# Patient Record
Sex: FEMALE | Race: WHITE | NOT HISPANIC OR LATINO | Employment: OTHER | URBAN - METROPOLITAN AREA
[De-identification: names, ages, dates, MRNs, and addresses within clinical notes are randomized per-mention and may not be internally consistent; named-entity substitution may affect disease eponyms.]

---

## 2017-12-09 ENCOUNTER — HOSPITAL ENCOUNTER (EMERGENCY)
Facility: HOSPITAL | Age: 67
Discharge: HOME | End: 2017-12-09
Attending: INTERNAL MEDICINE
Payer: MEDICARE

## 2017-12-09 VITALS
DIASTOLIC BLOOD PRESSURE: 76 MMHG | HEART RATE: 57 BPM | RESPIRATION RATE: 28 BRPM | TEMPERATURE: 98 F | WEIGHT: 164 LBS | OXYGEN SATURATION: 100 % | SYSTOLIC BLOOD PRESSURE: 168 MMHG | BODY MASS INDEX: 28 KG/M2

## 2017-12-09 DIAGNOSIS — R07.89 CHEST PAIN, MUSCULOSKELETAL: ICD-10-CM

## 2017-12-09 DIAGNOSIS — I10 UNCONTROLLED HYPERTENSION: Primary | ICD-10-CM

## 2017-12-09 DIAGNOSIS — R07.9 CHEST PAIN: ICD-10-CM

## 2017-12-09 LAB
ALBUMIN SERPL BCP-MCNC: 3.2 G/DL
ALP SERPL-CCNC: 84 U/L
ALT SERPL W/O P-5'-P-CCNC: 7 U/L
ANION GAP SERPL CALC-SCNC: 11 MMOL/L
AST SERPL-CCNC: 20 U/L
BASOPHILS # BLD AUTO: 0.02 K/UL
BASOPHILS NFR BLD: 0.2 %
BILIRUB SERPL-MCNC: 0.6 MG/DL
BNP SERPL-MCNC: 221 PG/ML
BUN SERPL-MCNC: 13 MG/DL
CALCIUM SERPL-MCNC: 9.4 MG/DL
CHLORIDE SERPL-SCNC: 101 MMOL/L
CO2 SERPL-SCNC: 27 MMOL/L
CREAT SERPL-MCNC: 1.1 MG/DL
DIFFERENTIAL METHOD: ABNORMAL
EOSINOPHIL # BLD AUTO: 0.1 K/UL
EOSINOPHIL NFR BLD: 1.5 %
ERYTHROCYTE [DISTWIDTH] IN BLOOD BY AUTOMATED COUNT: 15.7 %
EST. GFR  (AFRICAN AMERICAN): >60 ML/MIN/1.73 M^2
EST. GFR  (NON AFRICAN AMERICAN): 52.1 ML/MIN/1.73 M^2
GLUCOSE SERPL-MCNC: 119 MG/DL
HCT VFR BLD AUTO: 38 %
HGB BLD-MCNC: 12.6 G/DL
LYMPHOCYTES # BLD AUTO: 2.7 K/UL
LYMPHOCYTES NFR BLD: 28.9 %
MCH RBC QN AUTO: 29.4 PG
MCHC RBC AUTO-ENTMCNC: 33.2 G/DL
MCV RBC AUTO: 89 FL
MONOCYTES # BLD AUTO: 0.9 K/UL
MONOCYTES NFR BLD: 9.8 %
NEUTROPHILS # BLD AUTO: 5.5 K/UL
NEUTROPHILS NFR BLD: 59.5 %
PLATELET # BLD AUTO: 265 K/UL
PMV BLD AUTO: 9.4 FL
POTASSIUM SERPL-SCNC: 4.6 MMOL/L
PROT SERPL-MCNC: 7.4 G/DL
RBC # BLD AUTO: 4.29 M/UL
SODIUM SERPL-SCNC: 139 MMOL/L
TROPONIN I SERPL DL<=0.01 NG/ML-MCNC: 0.01 NG/ML
WBC # BLD AUTO: 9.2 K/UL

## 2017-12-09 PROCEDURE — 63600175 PHARM REV CODE 636 W HCPCS: Performed by: INTERNAL MEDICINE

## 2017-12-09 PROCEDURE — 83880 ASSAY OF NATRIURETIC PEPTIDE: CPT

## 2017-12-09 PROCEDURE — 93010 ELECTROCARDIOGRAM REPORT: CPT | Mod: ,,, | Performed by: NUCLEAR MEDICINE

## 2017-12-09 PROCEDURE — 85025 COMPLETE CBC W/AUTO DIFF WBC: CPT

## 2017-12-09 PROCEDURE — 99900035 HC TECH TIME PER 15 MIN (STAT)

## 2017-12-09 PROCEDURE — 93005 ELECTROCARDIOGRAM TRACING: CPT

## 2017-12-09 PROCEDURE — 84484 ASSAY OF TROPONIN QUANT: CPT

## 2017-12-09 PROCEDURE — 99284 EMERGENCY DEPT VISIT MOD MDM: CPT | Mod: 25

## 2017-12-09 PROCEDURE — 96372 THER/PROPH/DIAG INJ SC/IM: CPT

## 2017-12-09 PROCEDURE — 80053 COMPREHEN METABOLIC PANEL: CPT

## 2017-12-09 PROCEDURE — 25000003 PHARM REV CODE 250: Performed by: INTERNAL MEDICINE

## 2017-12-09 RX ORDER — KETOROLAC TROMETHAMINE 30 MG/ML
30 INJECTION, SOLUTION INTRAMUSCULAR; INTRAVENOUS
Status: COMPLETED | OUTPATIENT
Start: 2017-12-09 | End: 2017-12-09

## 2017-12-09 RX ORDER — TRAMADOL HYDROCHLORIDE AND ACETAMINOPHEN 37.5; 325 MG/1; MG/1
1 TABLET, FILM COATED ORAL EVERY 4 HOURS PRN
Qty: 18 TABLET | Refills: 0 | Status: SHIPPED | OUTPATIENT
Start: 2017-12-09 | End: 2017-12-19

## 2017-12-09 RX ORDER — CLONIDINE HYDROCHLORIDE 0.1 MG/1
0.1 TABLET ORAL
Status: COMPLETED | OUTPATIENT
Start: 2017-12-09 | End: 2017-12-09

## 2017-12-09 RX ORDER — AMLODIPINE BESYLATE 5 MG/1
5 TABLET ORAL
Status: COMPLETED | OUTPATIENT
Start: 2017-12-09 | End: 2017-12-09

## 2017-12-09 RX ADMIN — CLONIDINE HYDROCHLORIDE 0.1 MG: 0.1 TABLET ORAL at 03:12

## 2017-12-09 RX ADMIN — KETOROLAC TROMETHAMINE 30 MG: 30 INJECTION, SOLUTION INTRAMUSCULAR at 03:12

## 2017-12-09 RX ADMIN — NITROGLYCERIN 0.5 INCH: 20 OINTMENT TOPICAL at 03:12

## 2017-12-09 RX ADMIN — AMLODIPINE BESYLATE 5 MG: 5 TABLET ORAL at 03:12

## 2017-12-09 NOTE — ED NOTES
Patient reports left breast chest pain, that is 10/10 sharp pain that started this morning while shoveling ice. Patient also states having a productive cough x 1 month with white sputum.     LOC: The patient is awake, alert and aware of environment with an appropriate affect, the patient is oriented x 3 and speaking appropriately.  APPEARANCE: Patient resting comfortably and in no acute distress, patient is clean and well groomed, patient's clothing is properly fastened.  HEENT: Brief WNL  SKIN: Brief WNL. Dry.   MUSCULOSKELETAL: Brief WNL. Bilateral shoulder pain, chronic  RESPIRATORY: Brief WNL. Loose cough that causes pain when coughing.   CARDIAC: Brief WNL. S1S2, NSR, apical and radial regular  GASTRO: Brief WNL  : Brief WNL  Peripheral Vasc: Brief WNL  NEURO: Brief WNL  PSYCH: Brief WNL

## 2017-12-09 NOTE — ED PROVIDER NOTES
Encounter Date: 2017       History     Chief Complaint   Patient presents with    Chest Pain     chest pain x 1 hour, reports shoveling heavy ice this am     Pt presents complaining of chest pain. States was shoveling ice from her porch when the pain started this morning but is getting worse. Pain is sharp, left anterior chest, constant, worse with breathing, coughing and movement; non radiating. States also some productive cough, white sputum. Denies fever, nausea, vomiting, diaphoresis or shortness of breath. Hx of HTN, did not took all her medications today          Review of patient's allergies indicates:  No Known Allergies  Past Medical History:   Diagnosis Date    Coronary artery disease     GERD (gastroesophageal reflux disease)     Hyperlipidemia     Hypertension      Past Surgical History:   Procedure Laterality Date    CARDIAC CATHETERIZATION N/A      SECTION, CLASSIC      HYSTERECTOMY       Family History   Problem Relation Age of Onset    Heart disease Mother     Heart disease Father     Hypertension Father     Heart disease Paternal Grandfather     Heart disease Paternal Grandmother     Heart disease Maternal Grandmother     Heart disease Maternal Grandfather      Social History   Substance Use Topics    Smoking status: Current Every Day Smoker     Packs/day: 0.80     Years: 50.00     Types: Cigarettes    Smokeless tobacco: Never Used    Alcohol use Yes      Comment: beer when at camp     Review of Systems   Constitutional: Negative for appetite change, chills and fever.   HENT: Negative for dental problem, ear pain and sore throat.    Eyes: Negative for visual disturbance.   Respiratory: Positive for cough. Negative for shortness of breath.    Cardiovascular: Positive for chest pain. Negative for palpitations.   Gastrointestinal: Negative for abdominal pain, blood in stool, diarrhea and vomiting.   Genitourinary: Negative for dysuria, pelvic pain and vaginal discharge.    Musculoskeletal: Negative for back pain and myalgias.   Skin: Negative for rash.   Neurological: Negative for weakness and headaches.   Psychiatric/Behavioral: Negative for sleep disturbance.   All other systems reviewed and are negative.      Physical Exam     Initial Vitals [12/09/17 1431]   BP Pulse Resp Temp SpO2   (!) 187/80 67 18 97.7 °F (36.5 °C) 100 %      MAP       115.67         Physical Exam    Nursing note and vitals reviewed.  Constitutional: She appears well-developed and well-nourished. No distress.   HENT:   Head: Normocephalic and atraumatic.   Mouth/Throat: Oropharynx is clear and moist.   Eyes: Conjunctivae are normal. Pupils are equal, round, and reactive to light.   Neck: Normal range of motion. Neck supple.   Cardiovascular: Normal rate, regular rhythm, normal heart sounds and intact distal pulses.   Pulmonary/Chest: She has wheezes.   Prolonged expiratory phase with mild wheezing; pain reproducible by deep breathing   Abdominal: Soft. Bowel sounds are normal. She exhibits no distension. There is no tenderness. There is no rebound and no guarding.   Musculoskeletal: Normal range of motion. She exhibits no edema.   Neurological: She is alert and oriented to person, place, and time. She has normal strength.   Skin: Skin is warm and dry. Capillary refill takes less than 2 seconds. No rash noted.   Psychiatric: Her behavior is normal.         ED Course   Procedures  Labs Reviewed   CBC W/ AUTO DIFFERENTIAL - Abnormal; Notable for the following:        Result Value    RDW 15.7 (*)     All other components within normal limits   COMPREHENSIVE METABOLIC PANEL - Abnormal; Notable for the following:     Glucose 119 (*)     Albumin 3.2 (*)     ALT 7 (*)     eGFR if non  52.1 (*)     All other components within normal limits   B-TYPE NATRIURETIC PEPTIDE - Abnormal; Notable for the following:      (*)     All other components within normal limits   TROPONIN I     EKG Readings:  (Independently Interpreted)   Initial Reading: No STEMI. Rhythm: Normal Sinus Rhythm. Heart Rate: 68. Ectopy: No Ectopy. Conduction: Normal. ST Segments: Normal ST Segments. Axis: Normal. Clinical Impression: Normal Sinus Rhythm     Results for orders placed or performed during the hospital encounter of 12/09/17   CBC auto differential   Result Value Ref Range    WBC 9.20 3.90 - 12.70 K/uL    RBC 4.29 4.00 - 5.40 M/uL    Hemoglobin 12.6 12.0 - 16.0 g/dL    Hematocrit 38.0 37.0 - 48.5 %    MCV 89 82 - 98 fL    MCH 29.4 27.0 - 31.0 pg    MCHC 33.2 32.0 - 36.0 g/dL    RDW 15.7 (H) 11.5 - 14.5 %    Platelets 265 150 - 350 K/uL    MPV 9.4 9.2 - 12.9 fL    Gran # 5.5 1.8 - 7.7 K/uL    Lymph # 2.7 1.0 - 4.8 K/uL    Mono # 0.9 0.3 - 1.0 K/uL    Eos # 0.1 0.0 - 0.5 K/uL    Baso # 0.02 0.00 - 0.20 K/uL    Gran% 59.5 38.0 - 73.0 %    Lymph% 28.9 18.0 - 48.0 %    Mono% 9.8 4.0 - 15.0 %    Eosinophil% 1.5 0.0 - 8.0 %    Basophil% 0.2 0.0 - 1.9 %    Differential Method Automated    Comprehensive metabolic panel   Result Value Ref Range    Sodium 139 136 - 145 mmol/L    Potassium 4.6 3.5 - 5.1 mmol/L    Chloride 101 95 - 110 mmol/L    CO2 27 23 - 29 mmol/L    Glucose 119 (H) 70 - 110 mg/dL    BUN, Bld 13 8 - 23 mg/dL    Creatinine 1.1 0.5 - 1.4 mg/dL    Calcium 9.4 8.7 - 10.5 mg/dL    Total Protein 7.4 6.0 - 8.4 g/dL    Albumin 3.2 (L) 3.5 - 5.2 g/dL    Total Bilirubin 0.6 0.1 - 1.0 mg/dL    Alkaline Phosphatase 84 55 - 135 U/L    AST 20 10 - 40 U/L    ALT 7 (L) 10 - 44 U/L    Anion Gap 11 8 - 16 mmol/L    eGFR if African American >60.0 >60 mL/min/1.73 m^2    eGFR if non  52.1 (A) >60 mL/min/1.73 m^2   Troponin I #1   Result Value Ref Range    Troponin I 0.009 0.000 - 0.026 ng/mL   B-Type natriuretic peptide (BNP)   Result Value Ref Range     (H) 0 - 99 pg/mL     Imaging Results          X-Ray Chest PA And Lateral (Final result)  Result time 12/09/17 16:25:10    Final result by Nolberto Holley MD  (12/09/17 16:25:10)                 Impression:         No acute findings.      Electronically signed by: PER SHANKAR MD  Date:     12/09/17  Time:    16:25              Narrative:    XR CHEST PA AND LATERAL, 12/09/17 16:16:18    Clinical indication: Acute chest pain    Findings:  No previous comparison.  Heart size is normal.  Lung fields are clear with minor nonspecific blunting of the left costophrenic angle.    Mild thoracic spondylosis.                                                   ED Course      Clinical Impression:   The primary encounter diagnosis was Uncontrolled hypertension. Diagnoses of Chest pain and Chest pain, musculoskeletal were also pertinent to this visit.    Disposition:   Disposition: Discharged  Condition: Stable                        Eulalio Segura MD  12/09/17 9943

## 2018-11-08 ENCOUNTER — LAB VISIT (OUTPATIENT)
Dept: LAB | Facility: HOSPITAL | Age: 68
End: 2018-11-08
Attending: INTERNAL MEDICINE
Payer: MEDICARE

## 2018-11-08 DIAGNOSIS — E04.1 NONTOXIC UNINODULAR GOITER: ICD-10-CM

## 2018-11-08 DIAGNOSIS — E04.1 NONTOXIC UNINODULAR GOITER: Primary | ICD-10-CM

## 2018-11-08 LAB
T4 FREE SERPL-MCNC: 1.08 NG/DL
THYROPEROXIDASE IGG SERPL-ACNC: <6 IU/ML
TSH SERPL DL<=0.005 MIU/L-ACNC: 2.02 UIU/ML

## 2018-11-08 PROCEDURE — 86376 MICROSOMAL ANTIBODY EACH: CPT

## 2018-11-08 PROCEDURE — 84439 ASSAY OF FREE THYROXINE: CPT | Mod: PO

## 2018-11-08 PROCEDURE — 36415 COLL VENOUS BLD VENIPUNCTURE: CPT | Mod: PO

## 2018-11-08 PROCEDURE — 84443 ASSAY THYROID STIM HORMONE: CPT | Mod: PO

## 2020-11-16 ENCOUNTER — OFFICE VISIT (OUTPATIENT)
Dept: PULMONOLOGY | Facility: CLINIC | Age: 70
End: 2020-11-16
Payer: MEDICARE

## 2020-11-16 ENCOUNTER — LAB VISIT (OUTPATIENT)
Dept: LAB | Facility: HOSPITAL | Age: 70
End: 2020-11-16
Attending: INTERNAL MEDICINE
Payer: MEDICARE

## 2020-11-16 VITALS
RESPIRATION RATE: 16 BRPM | SYSTOLIC BLOOD PRESSURE: 134 MMHG | HEIGHT: 64 IN | WEIGHT: 157.88 LBS | BODY MASS INDEX: 26.95 KG/M2 | DIASTOLIC BLOOD PRESSURE: 90 MMHG | OXYGEN SATURATION: 98 % | HEART RATE: 75 BPM

## 2020-11-16 DIAGNOSIS — R06.89 DYSPNEA AND RESPIRATORY ABNORMALITIES: Chronic | ICD-10-CM

## 2020-11-16 DIAGNOSIS — R06.00 DYSPNEA AND RESPIRATORY ABNORMALITIES: Primary | Chronic | ICD-10-CM

## 2020-11-16 DIAGNOSIS — R06.00 DYSPNEA AND RESPIRATORY ABNORMALITIES: Chronic | ICD-10-CM

## 2020-11-16 DIAGNOSIS — R06.89 DYSPNEA AND RESPIRATORY ABNORMALITIES: Primary | Chronic | ICD-10-CM

## 2020-11-16 DIAGNOSIS — F17.210 NICOTINE DEPENDENCE, CIGARETTES, UNCOMPLICATED: Chronic | ICD-10-CM

## 2020-11-16 DIAGNOSIS — R06.02 SHORTNESS OF BREATH: ICD-10-CM

## 2020-11-16 LAB — ERYTHROCYTE [SEDIMENTATION RATE] IN BLOOD BY WESTERGREN METHOD: 17 MM/HR (ref 0–36)

## 2020-11-16 PROCEDURE — 86039 ANTINUCLEAR ANTIBODIES (ANA): CPT

## 2020-11-16 PROCEDURE — 3075F PR MOST RECENT SYSTOLIC BLOOD PRESS GE 130-139MM HG: ICD-10-PCS | Mod: CPTII,S$GLB,, | Performed by: INTERNAL MEDICINE

## 2020-11-16 PROCEDURE — 86255 FLUORESCENT ANTIBODY SCREEN: CPT

## 2020-11-16 PROCEDURE — 3075F SYST BP GE 130 - 139MM HG: CPT | Mod: CPTII,S$GLB,, | Performed by: INTERNAL MEDICINE

## 2020-11-16 PROCEDURE — 3080F DIAST BP >= 90 MM HG: CPT | Mod: CPTII,S$GLB,, | Performed by: INTERNAL MEDICINE

## 2020-11-16 PROCEDURE — 1159F MED LIST DOCD IN RCRD: CPT | Mod: S$GLB,,, | Performed by: INTERNAL MEDICINE

## 2020-11-16 PROCEDURE — 99999 PR PBB SHADOW E&M-EST. PATIENT-LVL V: ICD-10-PCS | Mod: PBBFAC,,, | Performed by: INTERNAL MEDICINE

## 2020-11-16 PROCEDURE — 3080F PR MOST RECENT DIASTOLIC BLOOD PRESSURE >= 90 MM HG: ICD-10-PCS | Mod: CPTII,S$GLB,, | Performed by: INTERNAL MEDICINE

## 2020-11-16 PROCEDURE — 86140 C-REACTIVE PROTEIN: CPT

## 2020-11-16 PROCEDURE — 36415 COLL VENOUS BLD VENIPUNCTURE: CPT

## 2020-11-16 PROCEDURE — 99204 PR OFFICE/OUTPT VISIT, NEW, LEVL IV, 45-59 MIN: ICD-10-PCS | Mod: S$GLB,,, | Performed by: INTERNAL MEDICINE

## 2020-11-16 PROCEDURE — 86038 ANTINUCLEAR ANTIBODIES: CPT

## 2020-11-16 PROCEDURE — 3008F BODY MASS INDEX DOCD: CPT | Mod: CPTII,S$GLB,, | Performed by: INTERNAL MEDICINE

## 2020-11-16 PROCEDURE — 99999 PR PBB SHADOW E&M-EST. PATIENT-LVL V: CPT | Mod: PBBFAC,,, | Performed by: INTERNAL MEDICINE

## 2020-11-16 PROCEDURE — 86235 NUCLEAR ANTIGEN ANTIBODY: CPT | Mod: 59

## 2020-11-16 PROCEDURE — 85651 RBC SED RATE NONAUTOMATED: CPT

## 2020-11-16 PROCEDURE — 1159F PR MEDICATION LIST DOCUMENTED IN MEDICAL RECORD: ICD-10-PCS | Mod: S$GLB,,, | Performed by: INTERNAL MEDICINE

## 2020-11-16 PROCEDURE — 3008F PR BODY MASS INDEX (BMI) DOCUMENTED: ICD-10-PCS | Mod: CPTII,S$GLB,, | Performed by: INTERNAL MEDICINE

## 2020-11-16 PROCEDURE — 99204 OFFICE O/P NEW MOD 45 MIN: CPT | Mod: S$GLB,,, | Performed by: INTERNAL MEDICINE

## 2020-11-16 RX ORDER — ROPINIROLE 0.5 MG/1
TABLET, FILM COATED ORAL
Status: ON HOLD | COMMUNITY
Start: 2020-08-11 | End: 2023-01-01 | Stop reason: HOSPADM

## 2020-11-16 RX ORDER — DIPHENOXYLATE HYDROCHLORIDE AND ATROPINE SULFATE 2.5; .025 MG/1; MG/1
TABLET ORAL
Status: ON HOLD | COMMUNITY
End: 2023-01-01 | Stop reason: HOSPADM

## 2020-11-16 RX ORDER — ASPIRIN 81 MG/1
TABLET ORAL
Status: ON HOLD | COMMUNITY
End: 2023-01-01 | Stop reason: HOSPADM

## 2020-11-16 RX ORDER — TRAZODONE HYDROCHLORIDE 50 MG/1
50 TABLET ORAL
Status: ON HOLD | COMMUNITY
Start: 2020-10-27 | End: 2023-01-01 | Stop reason: HOSPADM

## 2020-11-16 RX ORDER — ALBUTEROL SULFATE 0.83 MG/ML
3 SOLUTION RESPIRATORY (INHALATION)
Status: ON HOLD | COMMUNITY
End: 2023-01-01 | Stop reason: HOSPADM

## 2020-11-16 RX ORDER — AMLODIPINE BESYLATE 5 MG/1
5 TABLET ORAL
Status: ON HOLD | COMMUNITY
Start: 2020-10-13 | End: 2023-01-01 | Stop reason: HOSPADM

## 2020-11-16 RX ORDER — SERTRALINE HYDROCHLORIDE 25 MG/1
TABLET, FILM COATED ORAL
Status: ON HOLD | COMMUNITY
End: 2023-01-01 | Stop reason: HOSPADM

## 2020-11-16 RX ORDER — NITROGLYCERIN 0.4 MG/1
TABLET SUBLINGUAL
Status: ON HOLD | COMMUNITY
End: 2023-01-01 | Stop reason: HOSPADM

## 2020-11-16 NOTE — PATIENT INSTRUCTIONS
Lung Anatomy  Your lungs take air in to give your body oxygen, which the body needs to work. Your lungs, like all the tissues in your body, are made up of billions of tiny specialized cells. Old lung cells die and are replaced by new, identical lung cells. This natural process helps ensure healthy lungs.    Date Last Reviewed: 11/1/2016  © [id number] [employer]. [address] [place] [address], [place] [address]. All rights reserved. This information is not intended as a substitute for professional medical care. Always follow your healthcare professional's instructions.

## 2020-11-16 NOTE — PROGRESS NOTES
New patient    Subjective:      Patient ID: Yelena Briceño is a 70 y.o. female.    Patient Active Problem List   Diagnosis    Essential hypertension, benign    Other and unspecified hyperlipidemia    Coronary atherosclerosis of unspecified type of vessel, native or graft    Esophageal reflux    PAD (peripheral artery disease)    Nicotine dependence, cigarettes, uncomplicated    Dyspnea and respiratory abnormalities       Problem list has been reviewed.    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 S/P definitve right hemicolectomy colon cancer. Adjuvant chemotherapy was deferred. Colonoscopy in May 2019: 3 polyps removed.     PET 10/20/20:Patchy nodular and more dense consolidation in the anterior aspects of the upper lobes, lingula, right middle lobe, and to a lesser extent inferomedial right lower lobe with mild to moderate FDG activity. This is favored to be infectious/inflammatory rather than neoplastic/metastatic in etiology.      Treated with PO antibiotics - AZITHROMYCIN ( Z NABOR) .    Patient reports cough and difficulty breathing and denies hemoptysis. Patient denies recent sick contacts.   Patient does not have new pets. Patient does not have a history of asthma. Patient does not have a history of environmental allergens.  Patient has not traveled recently. Patient does have a history of smoking. She has smoked for 56 years. She smoked up to 3 - 4 packs per day. She currently smoke approximately 0.5 PPD. Patient has  had a previous chest x-ray. Patient has not had a PPD done.      Previous Report Reviewed: lab reports, office notes and radiology reports     Past Medical History: The following portions of the patient's history were reviewed and updated as appropriate:   She  has a  past surgical history that includes Hysterectomy;  section, classic; and Cardiac catheterization (N/A).  Her family history includes Heart disease in her father, maternal grandfather, maternal grandmother, mother, paternal grandfather, and paternal grandmother; Hypertension in her father.  She  reports that she has been smoking cigarettes. She has a 40.00 pack-year smoking history. She has never used smokeless tobacco. She reports current alcohol use. She reports that she does not use drugs.  She has a current medication list which includes the following prescription(s): albuterol, amlodipine, aspirin, atorvastatin, clopidogrel, diphenoxylate-atropine 2.5-0.025 mg, ergocalciferol, furosemide, isosorbide mononitrate, lisinopril, multivit-min/ferrous fumarate, nitroglycerin, pantoprazole, ropinirole, sertraline, trazodone, and aspirin.  She has No Known Allergies..    Review of Systems   Constitutional: Positive for weight loss and weight gain. Negative for chills, activity change, appetite change and weakness.   HENT: Negative for rhinorrhea, sore throat, trouble swallowing and congestion.    Eyes: Negative for itching.   Respiratory: Positive for cough, sputum production, wheezing, asthma nighttime symptoms and dyspnea on extertion.    Cardiovascular: Positive for chest pain and leg swelling. Negative for palpitations.   Endocrine: Negative for cold intolerance and heat intolerance.    Musculoskeletal: Positive for arthralgias, back pain and gait problem.   Skin: Negative for rash.   Gastrointestinal: Positive for acid reflux. Negative for nausea, vomiting, abdominal pain and abdominal distention.        Dairrhea   Neurological: Negative for dizziness, light-headedness and headaches.   Psychiatric/Behavioral: Positive for sleep disturbance.          Occupational History:  , Cook, .  Denies occupational exposure to asbestos, silica and petrochemicals.    Avocational Exposures:  none    Pet  "Exposures:  Dog: Denies allergy to dog dander.       Objective:     Vitals:    11/16/20 1428   BP: (!) 134/90   Pulse: 75   Resp: 16   SpO2: 98%   Weight: 71.6 kg (157 lb 13.6 oz)   Height: 5' 4" (1.626 m)     Body mass index is 27.09 kg/m².    Physical Exam  Constitutional:       Appearance: She is well-developed.   HENT:      Head: Normocephalic and atraumatic.      Right Ear: Hearing, tympanic membrane, ear canal and external ear normal.      Left Ear: Hearing, tympanic membrane, ear canal and external ear normal.      Nose: Nose normal. No mucosal edema or rhinorrhea.      Mouth/Throat:      Pharynx: Uvula midline.   Eyes:      Conjunctiva/sclera: Conjunctivae normal.      Pupils: Pupils are equal, round, and reactive to light.   Neck:      Musculoskeletal: Neck supple.      Thyroid: No thyroid mass or thyromegaly.      Vascular: No carotid bruit.      Trachea: Trachea normal.   Cardiovascular:      Rate and Rhythm: Normal rate and regular rhythm.      Pulses:           Dorsalis pedis pulses are 0 on the right side and 0 on the left side.      Heart sounds: S1 normal and S2 normal. No murmur. No gallop.       Comments: Doppler pulses are unable faintly  Pulmonary:      Effort: Pulmonary effort is normal.      Breath sounds: Normal breath sounds. No wheezing, rhonchi or rales.   Abdominal:      General: Bowel sounds are normal. There is no distension.      Palpations: Abdomen is soft.      Tenderness: There is no abdominal tenderness. There is no guarding.   Musculoskeletal: Normal range of motion.   Lymphadenopathy:      Cervical: No cervical adenopathy.   Skin:     General: Skin is warm and dry.      Findings: No rash.   Neurological:      Mental Status: She is alert and oriented to person, place, and time.      Cranial Nerves: No cranial nerve deficit.      Sensory: No sensory deficit.         Personal Diagnostic Review  No results found for this or any previous visit.      Assessment /Plan:     Discussed " diagnosis, its evaluation, treatment and usual course. All questions answered.    Problem List Items Addressed This Visit        Other    Nicotine dependence, cigarettes, uncomplicated (Chronic)    Current Assessment & Plan     I advised patient to quit, and offered support.  Discussed current use pattern.  Asked patient to inform me when they set a quit date.         Relevant Orders    Ambulatory referral/consult to Smoking Cessation Program    Dyspnea and respiratory abnormalities - Primary (Chronic)    Current Assessment & Plan     Etiology unclear.  Multifactorial etiology suspected.  Likely contributors to etiology (checked)    [x] Pulmonary airway disease    [x]  Pulmonary parenchymal disease  [x] Pulmonary vascular disease   [x] Pleural disease  [] Pulmonary vasculitis  [] Hypoventilation ( chest wall deformity, neuromuscular disease, obesity etc)  [] Anemia  [] Thyroid disease.  [x] Cardiac illess  []         Sleep disorder    EVALUATION:  [x]        Complete PFT with bronchodilator.  []        Bronchial challenge with methacholine.   [x]        Stress test, pulmonary.  [x]        PULM - Arterial Blood Gases  [x]        Chest X Ray  []        CT scan of chest.   [x]        DAMIAN  [x]        Sedimentation rate  [x]        C-reactive protein  [x]        Anti-neutrophilic cytoplasmic antibody          PLAN:  Discussed diagnosis, its evaluation, treatment and usual course.  All questions answered.        Call if shortness of breath worsens, blood in sputum, change in character of cough, development of fever or chills, inability to maintain nutrition and hydration.     Re evaluate in four weeks with results.           Relevant Orders    DAMIAN Screen w/Reflex    C-Reactive Protein    Sedimentation rate    Anti-Neutrophilic Cytoplasmic Antibody    Complete PFT with bronchodilator    PULM - Arterial Blood Gases--in addition to PFT only    Pulmonary stress test    X-Ray Chest PA And Lateral              TIME SPENT WITH  PATIENT: Time spent: 40 minutes in face to face  discussion concerning diagnosis, prognosis, review of lab and test results, benefits of treatment as well as management of disease, counseling of patient and coordination of care between various health  care providers . Greater than half the time spent was used for coordination of care and counseling of patient.     Follow up in about 1 month (around 12/16/2020) for Tobacco use disorder, Shortness of breath.

## 2020-11-16 NOTE — ASSESSMENT & PLAN NOTE
Etiology unclear.  Multifactorial etiology suspected.  Likely contributors to etiology (checked)    [x] Pulmonary airway disease    [x]  Pulmonary parenchymal disease  [x] Pulmonary vascular disease   [x] Pleural disease  [] Pulmonary vasculitis  [] Hypoventilation ( chest wall deformity, neuromuscular disease, obesity etc)  [] Anemia  [] Thyroid disease.  [x] Cardiac illess  []         Sleep disorder    EVALUATION:  [x]        Complete PFT with bronchodilator.  []        Bronchial challenge with methacholine.   [x]        Stress test, pulmonary.  [x]        PULM - Arterial Blood Gases  [x]        Chest X Ray  []        CT scan of chest.   [x]        DAMIAN  [x]        Sedimentation rate  [x]        C-reactive protein  [x]        Anti-neutrophilic cytoplasmic antibody          PLAN:  Discussed diagnosis, its evaluation, treatment and usual course.  All questions answered.        Call if shortness of breath worsens, blood in sputum, change in character of cough, development of fever or chills, inability to maintain nutrition and hydration.     Re evaluate in four weeks with results.

## 2020-11-16 NOTE — ASSESSMENT & PLAN NOTE
I advised patient to quit, and offered support.  Discussed current use pattern.  Asked patient to inform me when they set a quit date.

## 2020-11-17 LAB
ANA PATTERN 1: NORMAL
ANA SER QL IF: POSITIVE
ANA TITR SER IF: NORMAL {TITER}
CRP SERPL-MCNC: 9.9 MG/L (ref 0–8.2)

## 2020-11-19 LAB
ANCA AB TITR SER IF: NORMAL TITER
ANTI SM ANTIBODY: 0.05 RATIO (ref 0–0.99)
ANTI SM/RNP ANTIBODY: 0.05 RATIO (ref 0–0.99)
ANTI-SM INTERPRETATION: NEGATIVE
ANTI-SM/RNP INTERPRETATION: NEGATIVE
ANTI-SSA ANTIBODY: 0.04 RATIO (ref 0–0.99)
ANTI-SSA INTERPRETATION: NEGATIVE
ANTI-SSB ANTIBODY: 0.05 RATIO (ref 0–0.99)
ANTI-SSB INTERPRETATION: NEGATIVE
DSDNA AB SER-ACNC: NORMAL [IU]/ML
P-ANCA TITR SER IF: NORMAL TITER

## 2021-01-11 ENCOUNTER — TELEPHONE (OUTPATIENT)
Dept: PULMONOLOGY | Facility: CLINIC | Age: 71
End: 2021-01-11

## 2021-10-27 ENCOUNTER — HOSPITAL ENCOUNTER (OUTPATIENT)
Dept: RADIOLOGY | Facility: HOSPITAL | Age: 71
Discharge: HOME | End: 2021-10-27
Attending: INTERNAL MEDICINE
Payer: MEDICARE

## 2021-10-27 DIAGNOSIS — J44.1 ACUTE EXACERBATION OF CHRONIC OBSTRUCTIVE PULMONARY DISEASE: Primary | ICD-10-CM

## 2021-10-27 DIAGNOSIS — J44.1 ACUTE EXACERBATION OF CHRONIC OBSTRUCTIVE PULMONARY DISEASE: ICD-10-CM

## 2021-10-27 PROCEDURE — 71046 XR CHEST PA AND LATERAL: ICD-10-PCS | Mod: 26,,, | Performed by: RADIOLOGY

## 2021-10-27 PROCEDURE — 71046 X-RAY EXAM CHEST 2 VIEWS: CPT | Mod: TC,PO

## 2021-10-27 PROCEDURE — 71046 X-RAY EXAM CHEST 2 VIEWS: CPT | Mod: 26,,, | Performed by: RADIOLOGY

## 2022-01-01 ENCOUNTER — HOSPITAL ENCOUNTER (EMERGENCY)
Facility: HOSPITAL | Age: 72
Discharge: HOME | End: 2022-11-07
Attending: EMERGENCY MEDICINE
Payer: MEDICARE

## 2022-01-01 ENCOUNTER — HOSPITAL ENCOUNTER (OUTPATIENT)
Dept: RADIOLOGY | Facility: HOSPITAL | Age: 72
Discharge: HOME | End: 2022-12-19
Attending: INTERNAL MEDICINE
Payer: MEDICARE

## 2022-01-01 ENCOUNTER — HOSPITAL ENCOUNTER (EMERGENCY)
Facility: HOSPITAL | Age: 72
Discharge: HOME | End: 2022-10-14
Attending: EMERGENCY MEDICINE
Payer: MEDICARE

## 2022-01-01 VITALS
BODY MASS INDEX: 24.07 KG/M2 | RESPIRATION RATE: 22 BRPM | TEMPERATURE: 98 F | OXYGEN SATURATION: 95 % | SYSTOLIC BLOOD PRESSURE: 130 MMHG | DIASTOLIC BLOOD PRESSURE: 64 MMHG | HEART RATE: 88 BPM | WEIGHT: 140.19 LBS

## 2022-01-01 VITALS
TEMPERATURE: 98 F | BODY MASS INDEX: 24.89 KG/M2 | WEIGHT: 145 LBS | OXYGEN SATURATION: 97 % | HEART RATE: 77 BPM | SYSTOLIC BLOOD PRESSURE: 193 MMHG | DIASTOLIC BLOOD PRESSURE: 83 MMHG | RESPIRATION RATE: 18 BRPM

## 2022-01-01 VITALS — WEIGHT: 140.19 LBS | HEIGHT: 64 IN | BODY MASS INDEX: 23.93 KG/M2

## 2022-01-01 DIAGNOSIS — G57.93 NEURALGIA OF BOTH LOWER EXTREMITIES: ICD-10-CM

## 2022-01-01 DIAGNOSIS — R09.02 HYPOXIA: ICD-10-CM

## 2022-01-01 DIAGNOSIS — Z12.31 VISIT FOR SCREENING MAMMOGRAM: ICD-10-CM

## 2022-01-01 DIAGNOSIS — J10.1 INFLUENZA A: ICD-10-CM

## 2022-01-01 DIAGNOSIS — R04.0 ANTERIOR EPISTAXIS: Primary | ICD-10-CM

## 2022-01-01 DIAGNOSIS — J18.9 PNEUMONIA OF RIGHT LOWER LOBE DUE TO INFECTIOUS ORGANISM: Primary | ICD-10-CM

## 2022-01-01 LAB
ALBUMIN SERPL BCP-MCNC: 3.1 G/DL (ref 3.5–5.2)
ALLENS TEST: ABNORMAL
ALP SERPL-CCNC: 60 U/L (ref 55–135)
ALT SERPL W/O P-5'-P-CCNC: 7 U/L (ref 10–44)
ANION GAP SERPL CALC-SCNC: 13 MMOL/L (ref 8–16)
AST SERPL-CCNC: 27 U/L (ref 10–40)
BASOPHILS # BLD AUTO: 0.01 K/UL (ref 0–0.2)
BASOPHILS NFR BLD: 0.1 % (ref 0–1.9)
BILIRUB SERPL-MCNC: 0.8 MG/DL (ref 0.1–1)
BNP SERPL-MCNC: 195 PG/ML (ref 0–99)
BUN SERPL-MCNC: 14 MG/DL (ref 8–23)
CALCIUM SERPL-MCNC: 8.8 MG/DL (ref 8.7–10.5)
CHLORIDE SERPL-SCNC: 92 MMOL/L (ref 95–110)
CO2 SERPL-SCNC: 21 MMOL/L (ref 23–29)
CREAT SERPL-MCNC: 1.2 MG/DL (ref 0.5–1.4)
CTP QC/QA: YES
DELSYS: ABNORMAL
DIFFERENTIAL METHOD: ABNORMAL
EOSINOPHIL # BLD AUTO: 0 K/UL (ref 0–0.5)
EOSINOPHIL NFR BLD: 0.5 % (ref 0–8)
ERYTHROCYTE [DISTWIDTH] IN BLOOD BY AUTOMATED COUNT: 13.7 % (ref 11.5–14.5)
EST. GFR  (NO RACE VARIABLE): 48.1 ML/MIN/1.73 M^2
GLUCOSE SERPL-MCNC: 125 MG/DL (ref 70–110)
HCO3 UR-SCNC: 26.4 MMOL/L (ref 24–28)
HCT VFR BLD AUTO: 32.5 % (ref 37–48.5)
HGB BLD-MCNC: 11.5 G/DL (ref 12–16)
IMM GRANULOCYTES # BLD AUTO: 0.02 K/UL (ref 0–0.04)
IMM GRANULOCYTES NFR BLD AUTO: 0.2 % (ref 0–0.5)
LYMPHOCYTES # BLD AUTO: 1 K/UL (ref 1–4.8)
LYMPHOCYTES NFR BLD: 12.2 % (ref 18–48)
MCH RBC QN AUTO: 32 PG (ref 27–31)
MCHC RBC AUTO-ENTMCNC: 35.4 G/DL (ref 32–36)
MCV RBC AUTO: 91 FL (ref 82–98)
MODE: ABNORMAL
MONOCYTES # BLD AUTO: 0.4 K/UL (ref 0.3–1)
MONOCYTES NFR BLD: 4.6 % (ref 4–15)
NEUTROPHILS # BLD AUTO: 6.7 K/UL (ref 1.8–7.7)
NEUTROPHILS NFR BLD: 82.4 % (ref 38–73)
NRBC BLD-RTO: 0 /100 WBC
PCO2 BLDA: 36 MMHG (ref 35–45)
PH SMN: 7.47 [PH] (ref 7.35–7.45)
PLATELET # BLD AUTO: 158 K/UL (ref 150–450)
PMV BLD AUTO: 9.4 FL (ref 9.2–12.9)
PO2 BLDA: 54 MMHG (ref 80–100)
POC BE: 3 MMOL/L
POC MOLECULAR INFLUENZA A AGN: POSITIVE
POC MOLECULAR INFLUENZA B AGN: NEGATIVE
POC SATURATED O2: 90 % (ref 95–100)
POTASSIUM SERPL-SCNC: 4.3 MMOL/L (ref 3.5–5.1)
PROT SERPL-MCNC: 6.8 G/DL (ref 6–8.4)
RBC # BLD AUTO: 3.59 M/UL (ref 4–5.4)
SAMPLE: ABNORMAL
SITE: ABNORMAL
SODIUM SERPL-SCNC: 126 MMOL/L (ref 136–145)
TROPONIN I SERPL DL<=0.01 NG/ML-MCNC: 0.01 NG/ML (ref 0–0.03)
WBC # BLD AUTO: 8.11 K/UL (ref 3.9–12.7)

## 2022-01-01 PROCEDURE — 25000242 PHARM REV CODE 250 ALT 637 W/ HCPCS: Mod: ER | Performed by: EMERGENCY MEDICINE

## 2022-01-01 PROCEDURE — 99900035 HC TECH TIME PER 15 MIN (STAT): Mod: ER

## 2022-01-01 PROCEDURE — 63600175 PHARM REV CODE 636 W HCPCS: Mod: ER | Performed by: EMERGENCY MEDICINE

## 2022-01-01 PROCEDURE — 77067 MAMMO DIGITAL SCREENING BILAT WITH TOMO: ICD-10-PCS | Mod: 26,,, | Performed by: RADIOLOGY

## 2022-01-01 PROCEDURE — 94761 N-INVAS EAR/PLS OXIMETRY MLT: CPT | Mod: ER

## 2022-01-01 PROCEDURE — 99281 EMR DPT VST MAYX REQ PHY/QHP: CPT | Mod: ER

## 2022-01-01 PROCEDURE — 99285 EMERGENCY DEPT VISIT HI MDM: CPT | Mod: 25,ER

## 2022-01-01 PROCEDURE — 77063 BREAST TOMOSYNTHESIS BI: CPT | Mod: TC,PO

## 2022-01-01 PROCEDURE — 85025 COMPLETE CBC W/AUTO DIFF WBC: CPT | Mod: ER | Performed by: EMERGENCY MEDICINE

## 2022-01-01 PROCEDURE — 77063 BREAST TOMOSYNTHESIS BI: CPT | Mod: 26,,, | Performed by: RADIOLOGY

## 2022-01-01 PROCEDURE — 77067 SCR MAMMO BI INCL CAD: CPT | Mod: TC,PO

## 2022-01-01 PROCEDURE — 36600 WITHDRAWAL OF ARTERIAL BLOOD: CPT | Mod: ER

## 2022-01-01 PROCEDURE — 80053 COMPREHEN METABOLIC PANEL: CPT | Mod: ER | Performed by: EMERGENCY MEDICINE

## 2022-01-01 PROCEDURE — 77063 MAMMO DIGITAL SCREENING BILAT WITH TOMO: ICD-10-PCS | Mod: 26,,, | Performed by: RADIOLOGY

## 2022-01-01 PROCEDURE — 93005 ELECTROCARDIOGRAM TRACING: CPT | Mod: ER

## 2022-01-01 PROCEDURE — 96374 THER/PROPH/DIAG INJ IV PUSH: CPT | Mod: ER

## 2022-01-01 PROCEDURE — 77067 SCR MAMMO BI INCL CAD: CPT | Mod: 26,,, | Performed by: RADIOLOGY

## 2022-01-01 PROCEDURE — 93010 EKG 12-LEAD: ICD-10-PCS | Mod: ,,, | Performed by: INTERNAL MEDICINE

## 2022-01-01 PROCEDURE — 94640 AIRWAY INHALATION TREATMENT: CPT | Mod: ER,XB

## 2022-01-01 PROCEDURE — 93010 ELECTROCARDIOGRAM REPORT: CPT | Mod: ,,, | Performed by: INTERNAL MEDICINE

## 2022-01-01 PROCEDURE — 84484 ASSAY OF TROPONIN QUANT: CPT | Mod: ER | Performed by: EMERGENCY MEDICINE

## 2022-01-01 PROCEDURE — 27000221 HC OXYGEN, UP TO 24 HOURS: Mod: ER

## 2022-01-01 PROCEDURE — 83880 ASSAY OF NATRIURETIC PEPTIDE: CPT | Mod: ER | Performed by: EMERGENCY MEDICINE

## 2022-01-01 PROCEDURE — 87502 INFLUENZA DNA AMP PROBE: CPT | Mod: ER

## 2022-01-01 PROCEDURE — 82803 BLOOD GASES ANY COMBINATION: CPT | Mod: ER

## 2022-01-01 PROCEDURE — 25000003 PHARM REV CODE 250: Mod: ER | Performed by: EMERGENCY MEDICINE

## 2022-01-01 RX ORDER — IPRATROPIUM BROMIDE AND ALBUTEROL SULFATE 2.5; .5 MG/3ML; MG/3ML
3 SOLUTION RESPIRATORY (INHALATION)
Status: COMPLETED | OUTPATIENT
Start: 2022-01-01 | End: 2022-01-01

## 2022-01-01 RX ORDER — GABAPENTIN 300 MG/1
300 CAPSULE ORAL 3 TIMES DAILY
Qty: 42 CAPSULE | Refills: 0 | Status: ON HOLD | OUTPATIENT
Start: 2022-01-01 | End: 2023-01-01 | Stop reason: HOSPADM

## 2022-01-01 RX ORDER — TRAMADOL HYDROCHLORIDE 50 MG/1
50 TABLET ORAL EVERY 8 HOURS PRN
Qty: 12 TABLET | Refills: 0 | Status: SHIPPED | OUTPATIENT
Start: 2022-01-01 | End: 2022-01-01

## 2022-01-01 RX ORDER — OSELTAMIVIR PHOSPHATE 75 MG/1
CAPSULE ORAL
Status: ON HOLD | COMMUNITY
End: 2023-01-01 | Stop reason: HOSPADM

## 2022-01-01 RX ORDER — LEVOFLOXACIN 750 MG/1
750 TABLET ORAL
Status: COMPLETED | OUTPATIENT
Start: 2022-01-01 | End: 2022-01-01

## 2022-01-01 RX ORDER — METHYLPREDNISOLONE SOD SUCC 125 MG
125 VIAL (EA) INJECTION
Status: COMPLETED | OUTPATIENT
Start: 2022-01-01 | End: 2022-01-01

## 2022-01-01 RX ORDER — LEVOFLOXACIN 750 MG/1
750 TABLET ORAL DAILY
Qty: 4 TABLET | Refills: 0 | Status: SHIPPED | OUTPATIENT
Start: 2022-01-01 | End: 2022-01-01

## 2022-01-01 RX ADMIN — IPRATROPIUM BROMIDE AND ALBUTEROL SULFATE 3 ML: 2.5; .5 SOLUTION RESPIRATORY (INHALATION) at 05:11

## 2022-01-01 RX ADMIN — METHYLPREDNISOLONE SODIUM SUCCINATE 125 MG: 125 INJECTION, POWDER, FOR SOLUTION INTRAMUSCULAR; INTRAVENOUS at 05:11

## 2022-01-01 RX ADMIN — IPRATROPIUM BROMIDE AND ALBUTEROL SULFATE 3 ML: 2.5; .5 SOLUTION RESPIRATORY (INHALATION) at 06:11

## 2022-01-01 RX ADMIN — LEVOFLOXACIN 750 MG: 750 TABLET, FILM COATED ORAL at 07:11

## 2022-04-22 ENCOUNTER — HOSPITAL ENCOUNTER (OUTPATIENT)
Dept: RADIOLOGY | Facility: HOSPITAL | Age: 72
Discharge: HOME | End: 2022-04-22
Attending: INTERNAL MEDICINE
Payer: MEDICARE

## 2022-04-22 DIAGNOSIS — R25.2 CRAMP IN LOWER LEG: ICD-10-CM

## 2022-04-22 DIAGNOSIS — R25.2 CRAMP AND SPASM: ICD-10-CM

## 2022-04-22 DIAGNOSIS — M25.562 PAIN IN LEFT KNEE: Primary | ICD-10-CM

## 2022-04-22 PROCEDURE — 72100 X-RAY EXAM L-S SPINE 2/3 VWS: CPT | Mod: TC,PO

## 2022-04-22 PROCEDURE — 72100 XR LUMBAR SPINE AP AND LATERAL: ICD-10-PCS | Mod: 26,,, | Performed by: RADIOLOGY

## 2022-04-22 PROCEDURE — 72100 X-RAY EXAM L-S SPINE 2/3 VWS: CPT | Mod: 26,,, | Performed by: RADIOLOGY

## 2022-10-14 NOTE — ED PROVIDER NOTES
Encounter Date: 10/14/2022       History     Chief Complaint   Patient presents with    Epistaxis     States her nose has been bleeding since 5am. States she takes aspirin      72-year-old female with past medical history of hypertension, hyperlipidemia, coronary artery disease presents to the emergency department with complaints of right-sided nose bleed.  This started about 3 hours ago.  Patient says she has had nasal congestion over the past few days and was rubbing her nose when she noticed bleeding coming from the right side.  Patient was unable to get this bleeding to stop and came to the emergency department for further evaluation and treatment.  Patient denies any trauma, fevers, chills, sore throat, vomiting, chest pain, abdominal pain.    Review of patient's allergies indicates:  No Known Allergies  Past Medical History:   Diagnosis Date    Coronary artery disease     GERD (gastroesophageal reflux disease)     Hyperlipidemia     Hypertension      Past Surgical History:   Procedure Laterality Date    CARDIAC CATHETERIZATION N/A      SECTION, CLASSIC      HYSTERECTOMY       Family History   Problem Relation Age of Onset    Heart disease Mother     Heart disease Father     Hypertension Father     Heart disease Paternal Grandfather     Heart disease Paternal Grandmother     Heart disease Maternal Grandmother     Heart disease Maternal Grandfather      Social History     Tobacco Use    Smoking status: Every Day     Packs/day: 0.80     Years: 50.00     Pack years: 40.00     Types: Cigarettes    Smokeless tobacco: Never   Substance Use Topics    Alcohol use: Yes     Comment: beer when at camp    Drug use: No     Review of Systems   Constitutional:  Negative for diaphoresis and fever.   HENT:  Positive for nosebleeds. Negative for congestion, dental problem and sore throat.    Eyes:  Negative for pain and visual disturbance.   Respiratory:  Negative for cough and shortness of breath.    Cardiovascular:   Negative for chest pain and palpitations.   Gastrointestinal:  Negative for abdominal pain, diarrhea, nausea and vomiting.   Genitourinary:  Negative for dysuria and flank pain.   Musculoskeletal:  Negative for back pain and neck pain.   Skin:  Negative for rash and wound.   Neurological:  Negative for weakness, numbness and headaches.   Psychiatric/Behavioral:  Negative for agitation and confusion.      Physical Exam     Initial Vitals [10/14/22 0808]   BP Pulse Resp Temp SpO2   (!) 217/91 80 18 98.4 °F (36.9 °C) 98 %      MAP       --         Physical Exam    Constitutional: She appears well-developed and well-nourished.   HENT:   Head: Normocephalic and atraumatic.   In the right Nare, just inferior to the nasal opening there is a small abrasion with bleeding.  Bleeding easily stopped with direct pressure.  No septal hematoma.  No blood in the oropharynx.   Eyes: EOM are normal. Pupils are equal, round, and reactive to light.   Neck: Neck supple.   Normal range of motion.  Cardiovascular:  Normal rate and regular rhythm.           Pulmonary/Chest: Breath sounds normal. No respiratory distress.   Abdominal: She exhibits no distension. There is no abdominal tenderness.   Musculoskeletal:      Cervical back: Normal range of motion and neck supple.     Neurological: She is alert and oriented to person, place, and time.   Skin: Skin is warm and dry.   Psychiatric: She has a normal mood and affect.       ED Course   Procedures  Labs Reviewed - No data to display       Imaging Results    None          Medications - No data to display              ED Course as of 10/14/22 1643   Fri Oct 14, 2022   0822 Bleeding stopped, went over home care. BP elevated, no clinical signs of HTN emergency. She has not taken home BP medication today, will have her take this at home and f/u with PCP.  [BA]   0822 8:22 AM Reassessment: I reassessed the pt.  The pt is resting comfortably and is NAD.  Pt states their sx have improved.  Discussed test results, shared treatment plan, specific conditions for return, and the need for f/u.  Answered their questions at this time.  Pt understands and agrees to the plan.  The pt has remained hemodynamically stable through ED course and is stable for discharge.    [BA]      ED Course User Index  [BA] Julian Fuentes MD                 Clinical Impression:   Final diagnoses:  [R04.0] Anterior epistaxis (Primary)        ED Disposition Condition    Discharge Stable          ED Prescriptions    None       Follow-up Information       Follow up With Specialties Details Why Contact Info    Prakash Jo MD Pathology Schedule an appointment as soon as possible for a visit in 2 days For re-evaluation and further treatment, For wound re-check [facility] [facility] [address]  [facility] [address]  [phone]      [facility] - Emergency Dept Emergency Medicine Go today If symptoms worsen, For re-evaluation and further treatment, As needed [id number] [facility] [address]  [facility] [id number] [phone]             Julian Fuentes MD  10/14/22 5158

## 2022-11-07 NOTE — ED PROVIDER NOTES
Encounter Date: 2022       History     Chief Complaint   Patient presents with    Leg Pain     BLE pain, recent colonoscopy, and given Tamiflu for exposure to the flu at home     71 y/o F with PMH of HTN, HLD, GERD, CAD, COPD, history of colon cancer here with c/o bilateral leg pain. This started 3-4 days ago, is an electric shocking like pain in the legs that is constant, worsens with palpation. Patient denies any leg swelling, numbness, or weakness. She was exposed to influenza at home, was started on tamiflu ppx. Hey oxygen saturations were low in triage on arrival, and she does endorse a cough. Additionally, had a colonoscopy several days ago. Denies any new low back pain above her chronic level, fever, chills, chest pain, N/V/D.       Review of patient's allergies indicates:  No Known Allergies  Past Medical History:   Diagnosis Date    Coronary artery disease     GERD (gastroesophageal reflux disease)     Hyperlipidemia     Hypertension      Past Surgical History:   Procedure Laterality Date    CARDIAC CATHETERIZATION N/A      SECTION, CLASSIC      HYSTERECTOMY       Family History   Problem Relation Age of Onset    Heart disease Mother     Heart disease Father     Hypertension Father     Heart disease Paternal Grandfather     Heart disease Paternal Grandmother     Heart disease Maternal Grandmother     Heart disease Maternal Grandfather      Social History     Tobacco Use    Smoking status: Every Day     Packs/day: 0.80     Years: 50.00     Pack years: 40.00     Types: Cigarettes    Smokeless tobacco: Never   Substance Use Topics    Alcohol use: Yes     Comment: beer when at camp    Drug use: No     Review of Systems   Constitutional:  Negative for diaphoresis and fever.   HENT:  Negative for congestion, dental problem and sore throat.    Eyes:  Negative for pain and visual disturbance.   Respiratory:  Positive for cough. Negative for shortness of breath.    Cardiovascular:  Negative for chest  pain and palpitations.   Gastrointestinal:  Negative for abdominal pain, diarrhea, nausea and vomiting.   Genitourinary:  Negative for dysuria and flank pain.   Musculoskeletal:  Negative for neck pain.        Leg pain. Chronic back pain.    Skin:  Negative for rash and wound.   Neurological:  Negative for weakness, numbness and headaches.   Psychiatric/Behavioral:  Negative for agitation and confusion.      Physical Exam     Initial Vitals [11/07/22 1644]   BP Pulse Resp Temp SpO2   (!) 108/53 82 18 98 °F (36.7 °C) (!) 91 %      MAP       --         Physical Exam    Constitutional: She appears well-developed and well-nourished.   HENT:   Head: Normocephalic and atraumatic.   Mouth/Throat: Oropharynx is clear and moist.   Eyes: EOM are normal. Pupils are equal, round, and reactive to light.   Neck: Neck supple.   Normal range of motion.  Cardiovascular:  Normal rate and regular rhythm.           Pulmonary/Chest: She has no wheezes. She has no rales.   Decreased breath sounds bilaterally.    Abdominal: She exhibits no distension. There is no abdominal tenderness.   Musculoskeletal:         General: No tenderness. Normal range of motion.      Cervical back: Normal range of motion and neck supple.     Neurological: She is alert and oriented to person, place, and time. She has normal strength. No sensory deficit.   Neuropathic pain in a boot like distribution in the b/l lower extremities.    Skin: Skin is warm and dry.   Psychiatric: She has a normal mood and affect.       ED Course   Procedures  Labs Reviewed   CBC W/ AUTO DIFFERENTIAL - Abnormal; Notable for the following components:       Result Value    RBC 3.59 (*)     Hemoglobin 11.5 (*)     Hematocrit 32.5 (*)     MCH 32.0 (*)     Gran % 82.4 (*)     Lymph % 12.2 (*)     All other components within normal limits   B-TYPE NATRIURETIC PEPTIDE - Abnormal; Notable for the following components:     (*)     All other components within normal limits    COMPREHENSIVE METABOLIC PANEL - Abnormal; Notable for the following components:    Sodium 126 (*)     Chloride 92 (*)     CO2 21 (*)     Glucose 125 (*)     Albumin 3.1 (*)     ALT 7 (*)     eGFR 48.1 (*)     All other components within normal limits   POCT INFLUENZA A/B MOLECULAR - Abnormal; Notable for the following components:    POC Molecular Influenza A Ag Positive (*)     All other components within normal limits   ISTAT PROCEDURE - Abnormal; Notable for the following components:    POC PH 7.473 (*)     POC PO2 54 (*)     POC SATURATED O2 90 (*)     All other components within normal limits   TROPONIN I          Imaging Results              X-Ray Chest AP Portable (Final result)  Result time 11/07/22 17:51:47      Final result by Nolberto Holley MD (11/07/22 17:51:47)                   Impression:      Right lower lobe infiltrate.      Electronically signed by: Nolberto Holley MD  Date:    11/07/2022  Time:    17:51               Narrative:    EXAMINATION:  XR CHEST AP PORTABLE    CLINICAL HISTORY:  Shortness of breath, SOB;    COMPARISON:  10/27/2021 chest x-ray.    FINDINGS:  Normal heart size.    The left lung is clear.    Low-grade infiltrates suspected at the right lung base.                                       Medications   methylPREDNISolone sodium succinate injection 125 mg (125 mg Intravenous Given 11/7/22 1726)   albuterol-ipratropium 2.5 mg-0.5 mg/3 mL nebulizer solution 3 mL (3 mLs Nebulization Given 11/7/22 1800)   levoFLOXacin tablet 750 mg (750 mg Oral Given 11/7/22 1951)                 ED Course as of 11/07/22 2157 Mon Nov 07, 2022 1812 Julian BOSS M.D., am handing off care of the patient to oncoming physician: Dr. Monte.   We have discussed patient history, exam, and workup and treatment thus far, as well as potential disposition options. They understand, and will take over management from here forward.        [BA]   1945 Offered admission for RLL pneumonia; pt prefers  to go home . Strict return precautions discussed. Encouraged increased dietary salt intake.  [ND]      ED Course User Index  [BA] Julian Fuentes MD  [ND] Richie Monte MD          Recent Results (from the past 24 hour(s))   POCT Influenza A/B Molecular    Collection Time: 11/07/22  5:07 PM   Result Value Ref Range    POC Molecular Influenza A Ag Positive (A) Negative, Not Reported    POC Molecular Influenza B Ag Negative Negative, Not Reported     Acceptable Yes    CBC auto differential    Collection Time: 11/07/22  5:26 PM   Result Value Ref Range    WBC 8.11 3.90 - 12.70 K/uL    RBC 3.59 (L) 4.00 - 5.40 M/uL    Hemoglobin 11.5 (L) 12.0 - 16.0 g/dL    Hematocrit 32.5 (L) 37.0 - 48.5 %    MCV 91 82 - 98 fL    MCH 32.0 (H) 27.0 - 31.0 pg    MCHC 35.4 32.0 - 36.0 g/dL    RDW 13.7 11.5 - 14.5 %    Platelets 158 150 - 450 K/uL    MPV 9.4 9.2 - 12.9 fL    Immature Granulocytes 0.2 0.0 - 0.5 %    Gran # (ANC) 6.7 1.8 - 7.7 K/uL    Immature Grans (Abs) 0.02 0.00 - 0.04 K/uL    Lymph # 1.0 1.0 - 4.8 K/uL    Mono # 0.4 0.3 - 1.0 K/uL    Eos # 0.0 0.0 - 0.5 K/uL    Baso # 0.01 0.00 - 0.20 K/uL    nRBC 0 0 /100 WBC    Gran % 82.4 (H) 38.0 - 73.0 %    Lymph % 12.2 (L) 18.0 - 48.0 %    Mono % 4.6 4.0 - 15.0 %    Eosinophil % 0.5 0.0 - 8.0 %    Basophil % 0.1 0.0 - 1.9 %    Differential Method Automated    Brain natriuretic peptide    Collection Time: 11/07/22  5:26 PM   Result Value Ref Range     (H) 0 - 99 pg/mL   ISTAT PROCEDURE    Collection Time: 11/07/22  6:07 PM   Result Value Ref Range    POC PH 7.473 (H) 7.35 - 7.45    POC PCO2 36.0 35 - 45 mmHg    POC PO2 54 (LL) 80 - 100 mmHg    POC HCO3 26.4 24 - 28 mmol/L    POC BE 3 -2 to 2 mmol/L    POC SATURATED O2 90 (L) 95 - 100 %    Sample ARTERIAL     Site RR     Allens Test Pass     DelSys Room Air     Mode SPONT    Troponin I    Collection Time: 11/07/22  6:20 PM   Result Value Ref Range    Troponin I 0.007 0.000 - 0.026 ng/mL   Comprehensive  metabolic panel    Collection Time: 11/07/22  6:20 PM   Result Value Ref Range    Sodium 126 (L) 136 - 145 mmol/L    Potassium 4.3 3.5 - 5.1 mmol/L    Chloride 92 (L) 95 - 110 mmol/L    CO2 21 (L) 23 - 29 mmol/L    Glucose 125 (H) 70 - 110 mg/dL    BUN 14 8 - 23 mg/dL    Creatinine 1.2 0.5 - 1.4 mg/dL    Calcium 8.8 8.7 - 10.5 mg/dL    Total Protein 6.8 6.0 - 8.4 g/dL    Albumin 3.1 (L) 3.5 - 5.2 g/dL    Total Bilirubin 0.8 0.1 - 1.0 mg/dL    Alkaline Phosphatase 60 55 - 135 U/L    AST 27 10 - 40 U/L    ALT 7 (L) 10 - 44 U/L    Anion Gap 13 8 - 16 mmol/L    eGFR 48.1 (A) >60 mL/min/1.73 m^2       Patient's evaluation in the ED does not suggest any emergent or life threatening medical conditions requiring immediate intervention beyond what was provided in the ED, and I believe patient is safe for discharge.  Regardless, an unremarkable evaluation in the ED does not preclude the development or presence of a serious or life threatening condition. As such, patient was given return instructions for any change or worsening in symptoms.           Clinical Impression:   Final diagnoses:  [R09.02] Hypoxia  [J18.9] Pneumonia of right lower lobe due to infectious organism (Primary)  [J10.1] Influenza A  [G57.93] Neuralgia of both lower extremities        ED Disposition Condition    Discharge Stable          ED Prescriptions       Medication Sig Dispense Start Date End Date Auth. Provider    gabapentin (NEURONTIN) 300 MG capsule Take 1 capsule (300 mg total) by mouth 3 (three) times daily. for 14 days 42 capsule 11/7/2022 11/21/2022 Richie Monte MD    levoFLOXacin (LEVAQUIN) 750 MG tablet Take 1 tablet (750 mg total) by mouth once daily. for 4 days 4 tablet 11/8/2022 11/12/2022 Richie Monte MD    traMADoL (ULTRAM) 50 mg tablet Take 1 tablet (50 mg total) by mouth every 8 (eight) hours as needed (breakthrough pain not relieved by Tylenol). 12 tablet 11/7/2022 11/14/2022 Richie Monte MD          Follow-up  Information       Follow up With Specialties Details Why Contact Info    Prakash Jo MD Pathology Schedule an appointment as soon as possible for a visit in 3 days  [facility]  [facility] [address]  [phone]      [facility] - Emergency Dept Emergency Medicine  As needed, If symptoms worsen [id number]  [facility] [address] [phone]             Richie Monte MD  11/07/22 2845

## 2023-01-01 ENCOUNTER — OFFICE VISIT (OUTPATIENT)
Dept: ORTHOPEDICS | Facility: CLINIC | Age: 73
End: 2023-01-01
Payer: MEDICARE

## 2023-01-01 ENCOUNTER — HOSPITAL ENCOUNTER (INPATIENT)
Facility: HOSPITAL | Age: 73
LOS: 1 days | DRG: 208 | End: 2023-08-27
Attending: EMERGENCY MEDICINE | Admitting: INTERNAL MEDICINE
Payer: MEDICARE

## 2023-01-01 ENCOUNTER — HOSPITAL ENCOUNTER (OUTPATIENT)
Dept: RADIOLOGY | Facility: HOSPITAL | Age: 73
Discharge: HOME | End: 2023-05-09
Attending: ORTHOPAEDIC SURGERY
Payer: MEDICARE

## 2023-01-01 ENCOUNTER — HOSPITAL ENCOUNTER (EMERGENCY)
Facility: HOSPITAL | Age: 73
Discharge: HOME | End: 2023-04-29
Attending: EMERGENCY MEDICINE
Payer: MEDICARE

## 2023-01-01 ENCOUNTER — HOSPITAL ENCOUNTER (OUTPATIENT)
Dept: RADIOLOGY | Facility: HOSPITAL | Age: 73
Discharge: HOME | End: 2023-06-06
Attending: ORTHOPAEDIC SURGERY
Payer: MEDICARE

## 2023-01-01 ENCOUNTER — PATIENT MESSAGE (OUTPATIENT)
Dept: RESEARCH | Facility: HOSPITAL | Age: 73
End: 2023-01-01
Payer: MEDICARE

## 2023-01-01 ENCOUNTER — PATIENT MESSAGE (OUTPATIENT)
Dept: ORTHOPEDICS | Facility: CLINIC | Age: 73
End: 2023-01-01
Payer: MEDICARE

## 2023-01-01 VITALS
DIASTOLIC BLOOD PRESSURE: 54 MMHG | OXYGEN SATURATION: 96 % | RESPIRATION RATE: 24 BRPM | BODY MASS INDEX: 23.07 KG/M2 | HEIGHT: 64 IN | WEIGHT: 135.13 LBS | TEMPERATURE: 95 F | SYSTOLIC BLOOD PRESSURE: 124 MMHG

## 2023-01-01 VITALS — HEIGHT: 64 IN | BODY MASS INDEX: 24.07 KG/M2 | WEIGHT: 141 LBS

## 2023-01-01 VITALS
HEART RATE: 78 BPM | RESPIRATION RATE: 16 BRPM | TEMPERATURE: 99 F | DIASTOLIC BLOOD PRESSURE: 71 MMHG | WEIGHT: 141.88 LBS | HEIGHT: 64 IN | OXYGEN SATURATION: 95 % | BODY MASS INDEX: 24.22 KG/M2 | SYSTOLIC BLOOD PRESSURE: 171 MMHG

## 2023-01-01 VITALS — HEIGHT: 64 IN | WEIGHT: 141 LBS | BODY MASS INDEX: 24.07 KG/M2

## 2023-01-01 VITALS — BODY MASS INDEX: 24.07 KG/M2 | WEIGHT: 141 LBS | HEIGHT: 64 IN

## 2023-01-01 DIAGNOSIS — S52.602A CLOSED FRACTURE DISTAL RADIUS AND ULNA, LEFT, INITIAL ENCOUNTER: Primary | ICD-10-CM

## 2023-01-01 DIAGNOSIS — S52.502D CLOSED FRACTURE OF DISTAL ENDS OF LEFT RADIUS AND ULNA WITH ROUTINE HEALING, SUBSEQUENT ENCOUNTER: Primary | ICD-10-CM

## 2023-01-01 DIAGNOSIS — S52.502A CLOSED FRACTURE DISTAL RADIUS AND ULNA, LEFT, INITIAL ENCOUNTER: Primary | ICD-10-CM

## 2023-01-01 DIAGNOSIS — M25.532 LEFT WRIST PAIN: ICD-10-CM

## 2023-01-01 DIAGNOSIS — S52.502A CLOSED FRACTURE DISTAL RADIUS AND ULNA, LEFT, INITIAL ENCOUNTER: ICD-10-CM

## 2023-01-01 DIAGNOSIS — R07.9 CHEST PAIN: ICD-10-CM

## 2023-01-01 DIAGNOSIS — I48.91 ATRIAL FIBRILLATION: ICD-10-CM
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
## 2023-01-01 DIAGNOSIS — R06.02 SOB (SHORTNESS OF BREATH): ICD-10-CM

## 2023-01-01 DIAGNOSIS — S52.602A CLOSED FRACTURE DISTAL RADIUS AND ULNA, LEFT, INITIAL ENCOUNTER: ICD-10-CM

## 2023-01-01 DIAGNOSIS — S52.602D CLOSED FRACTURE OF DISTAL ENDS OF LEFT RADIUS AND ULNA WITH ROUTINE HEALING, SUBSEQUENT ENCOUNTER: Primary | ICD-10-CM

## 2023-01-01 DIAGNOSIS — I48.91 A-FIB: ICD-10-CM

## 2023-01-01 DIAGNOSIS — I48.91 ATRIAL FIBRILLATION WITH RAPID VENTRICULAR RESPONSE: Primary | ICD-10-CM

## 2023-01-01 DIAGNOSIS — R00.0 TACHYCARDIA: ICD-10-CM

## 2023-01-01 DIAGNOSIS — J44.1 COPD WITH ACUTE EXACERBATION: ICD-10-CM

## 2023-01-01 DIAGNOSIS — I50.30 PULMONARY EDEMA WITH DIASTOLIC CHF, NYHA CLASS 3: ICD-10-CM

## 2023-01-01 DIAGNOSIS — M25.532 LEFT WRIST PAIN: Primary | ICD-10-CM

## 2023-01-01 LAB
ALBUMIN SERPL BCP-MCNC: 1.8 G/DL (ref 3.5–5.2)
ALBUMIN SERPL BCP-MCNC: 2 G/DL (ref 3.5–5.2)
ALBUMIN SERPL BCP-MCNC: 2.6 G/DL (ref 3.5–5.2)
ALBUMIN SERPL BCP-MCNC: 3 G/DL (ref 3.5–5.2)
ALBUMIN SERPL BCP-MCNC: 3.1 G/DL (ref 3.5–5.2)
ALLENS TEST: ABNORMAL
ALP SERPL-CCNC: 21 U/L (ref 55–135)
ALP SERPL-CCNC: 28 U/L (ref 55–135)
ALP SERPL-CCNC: 42 U/L (ref 55–135)
ALP SERPL-CCNC: 53 U/L (ref 55–135)
ALT SERPL W/O P-5'-P-CCNC: 10 U/L (ref 10–44)
ALT SERPL W/O P-5'-P-CCNC: 10 U/L (ref 10–44)
ALT SERPL W/O P-5'-P-CCNC: 12 U/L (ref 10–44)
ALT SERPL W/O P-5'-P-CCNC: 12 U/L (ref 10–44)
AMMONIA PLAS-SCNC: 75 UMOL/L (ref 10–50)
ANION GAP SERPL CALC-SCNC: 12 MMOL/L (ref 8–16)
ANION GAP SERPL CALC-SCNC: 13 MMOL/L (ref 8–16)
ANION GAP SERPL CALC-SCNC: 13 MMOL/L (ref 8–16)
ANION GAP SERPL CALC-SCNC: 14 MMOL/L (ref 8–16)
ANION GAP SERPL CALC-SCNC: 15 MMOL/L (ref 8–16)
ANISOCYTOSIS BLD QL SMEAR: SLIGHT
AORTIC ROOT ANNULUS: 2.86 CM
APTT PPP: 35.6 SEC (ref 21–32)
APTT PPP: 41.3 SEC (ref 21–32)
APTT PPP: 43.4 SEC (ref 21–32)
APTT PPP: 47.9 SEC (ref 21–32)
ASCENDING AORTA: 2.65 CM
AST SERPL-CCNC: 38 U/L (ref 10–40)
AST SERPL-CCNC: 44 U/L (ref 10–40)
AST SERPL-CCNC: 45 U/L (ref 10–40)
AST SERPL-CCNC: 54 U/L (ref 10–40)
AV INDEX (PROSTH): 0.75
AV MEAN GRADIENT: 4 MMHG
AV PEAK GRADIENT: 6 MMHG
AV VALVE AREA BY VELOCITY RATIO: 1.85 CM²
AV VALVE AREA: 2.05 CM²
AV VELOCITY RATIO: 0.67
BACTERIA #/AREA URNS AUTO: ABNORMAL /HPF
BACTERIA #/AREA URNS HPF: ABNORMAL /HPF
BASOPHILS # BLD AUTO: 0.02 K/UL (ref 0–0.2)
BASOPHILS # BLD AUTO: 0.04 K/UL (ref 0–0.2)
BASOPHILS # BLD AUTO: 0.07 K/UL (ref 0–0.2)
BASOPHILS NFR BLD: 0.2 % (ref 0–1.9)
BASOPHILS NFR BLD: 0.5 % (ref 0–1.9)
BASOPHILS NFR BLD: 2.6 % (ref 0–1.9)
BILIRUB SERPL-MCNC: 1 MG/DL (ref 0.1–1)
BILIRUB SERPL-MCNC: 1.3 MG/DL (ref 0.1–1)
BILIRUB UR QL STRIP: ABNORMAL
BILIRUB UR QL STRIP: NEGATIVE
BNP SERPL-MCNC: 384 PG/ML (ref 0–99)
BNP SERPL-MCNC: 898 PG/ML (ref 0–99)
BSA FOR ECHO PROCEDURE: 1.67 M2
BUN SERPL-MCNC: 18 MG/DL (ref 8–23)
BUN SERPL-MCNC: 26 MG/DL (ref 8–23)
BUN SERPL-MCNC: 38 MG/DL (ref 8–23)
BUN SERPL-MCNC: 43 MG/DL (ref 8–23)
BUN SERPL-MCNC: 44 MG/DL (ref 8–23)
CALCIUM SERPL-MCNC: 7.5 MG/DL (ref 8.7–10.5)
CALCIUM SERPL-MCNC: 7.9 MG/DL (ref 8.7–10.5)
CALCIUM SERPL-MCNC: 8.7 MG/DL (ref 8.7–10.5)
CALCIUM SERPL-MCNC: 8.7 MG/DL (ref 8.7–10.5)
CALCIUM SERPL-MCNC: 9 MG/DL (ref 8.7–10.5)
CHLORIDE SERPL-SCNC: 93 MMOL/L (ref 95–110)
CHLORIDE SERPL-SCNC: 94 MMOL/L (ref 95–110)
CHLORIDE SERPL-SCNC: 94 MMOL/L (ref 95–110)
CLARITY UR REFRACT.AUTO: ABNORMAL
CLARITY UR: ABNORMAL
CO2 SERPL-SCNC: 19 MMOL/L (ref 23–29)
CO2 SERPL-SCNC: 20 MMOL/L (ref 23–29)
CO2 SERPL-SCNC: 20 MMOL/L (ref 23–29)
CO2 SERPL-SCNC: 21 MMOL/L (ref 23–29)
CO2 SERPL-SCNC: 22 MMOL/L (ref 23–29)
COLOR UR AUTO: YELLOW
COLOR UR: YELLOW
CREAT SERPL-MCNC: 1.1 MG/DL (ref 0.5–1.4)
CREAT SERPL-MCNC: 1.1 MG/DL (ref 0.5–1.4)
CREAT SERPL-MCNC: 1.6 MG/DL (ref 0.5–1.4)
CREAT SERPL-MCNC: 2.2 MG/DL (ref 0.5–1.4)
CREAT SERPL-MCNC: 2.2 MG/DL (ref 0.5–1.4)
CREAT UR-MCNC: 188 MG/DL (ref 15–325)
CV ECHO LV RWT: 0.47 CM
D DIMER PPP IA.FEU-MCNC: 28.68 MG/L FEU
DELSYS: ABNORMAL
DIFFERENTIAL METHOD: ABNORMAL
DOP CALC AO PEAK VEL: 1.26 M/S
DOP CALC AO VTI: 18.9 CM
DOP CALC LVOT AREA: 2.7 CM2
DOP CALC LVOT DIAMETER: 1.87 CM
DOP CALC LVOT PEAK VEL: 0.85 M/S
DOP CALC LVOT STROKE VOLUME: 38.71 CM3
DOP CALC RVOT PEAK VEL: 0.7 M/S
DOP CALC RVOT VTI: 11.6 CM
DOP CALCLVOT PEAK VEL VTI: 14.1 CM
E WAVE DECELERATION TIME: 205.53 MSEC
E/A RATIO: 3.22
E/E' RATIO: 13.65 M/S
ECHO LV POSTERIOR WALL: 1.01 CM (ref 0.6–1.1)
EOSINOPHIL # BLD AUTO: 0 K/UL (ref 0–0.5)
EOSINOPHIL NFR BLD: 0 % (ref 0–8)
ERYTHROCYTE [DISTWIDTH] IN BLOOD BY AUTOMATED COUNT: 15 % (ref 11.5–14.5)
ERYTHROCYTE [DISTWIDTH] IN BLOOD BY AUTOMATED COUNT: 15.2 % (ref 11.5–14.5)
ERYTHROCYTE [DISTWIDTH] IN BLOOD BY AUTOMATED COUNT: 15.4 % (ref 11.5–14.5)
ERYTHROCYTE [SEDIMENTATION RATE] IN BLOOD BY WESTERGREN METHOD: 24 MM/H
ERYTHROCYTE [SEDIMENTATION RATE] IN BLOOD BY WESTERGREN METHOD: 24 MM/H
EST. GFR  (NO RACE VARIABLE): 23 ML/MIN/1.73 M^2
EST. GFR  (NO RACE VARIABLE): 23 ML/MIN/1.73 M^2
EST. GFR  (NO RACE VARIABLE): 34 ML/MIN/1.73 M^2
EST. GFR  (NO RACE VARIABLE): 53 ML/MIN/1.73 M^2
EST. GFR  (NO RACE VARIABLE): 53.4 ML/MIN/1.73 M^2
FIO2: 100
FIO2: 100
FIO2: 28
FLOW: 2
FLOW: 5
FRACTIONAL SHORTENING: 30 % (ref 28–44)
GLUCOSE SERPL-MCNC: 126 MG/DL (ref 70–110)
GLUCOSE SERPL-MCNC: 131 MG/DL (ref 70–110)
GLUCOSE SERPL-MCNC: 131 MG/DL (ref 70–110)
GLUCOSE SERPL-MCNC: 176 MG/DL (ref 70–110)
GLUCOSE SERPL-MCNC: 187 MG/DL (ref 70–110)
GLUCOSE UR QL STRIP: NEGATIVE
GLUCOSE UR QL STRIP: NEGATIVE
HCO3 UR-SCNC: 22.5 MMOL/L (ref 24–28)
HCO3 UR-SCNC: 22.9 MMOL/L (ref 24–28)
HCO3 UR-SCNC: 24.7 MMOL/L (ref 24–28)
HCO3 UR-SCNC: 26.2 MMOL/L (ref 24–28)
HCT VFR BLD AUTO: 35.7 % (ref 37–48.5)
HCT VFR BLD AUTO: 35.7 % (ref 37–48.5)
HCT VFR BLD AUTO: 39.2 % (ref 37–48.5)
HGB BLD-MCNC: 12.2 G/DL (ref 12–16)
HGB BLD-MCNC: 12.5 G/DL (ref 12–16)
HGB BLD-MCNC: 13.4 G/DL (ref 12–16)
HGB UR QL STRIP: ABNORMAL
HGB UR QL STRIP: ABNORMAL
HYALINE CASTS #/AREA URNS LPF: 8 /LPF
HYALINE CASTS UR QL AUTO: 2 /LPF
IMM GRANULOCYTES # BLD AUTO: 0 K/UL (ref 0–0.04)
IMM GRANULOCYTES # BLD AUTO: 0.05 K/UL (ref 0–0.04)
IMM GRANULOCYTES # BLD AUTO: 0.06 K/UL (ref 0–0.04)
IMM GRANULOCYTES NFR BLD AUTO: 0 % (ref 0–0.5)
IMM GRANULOCYTES NFR BLD AUTO: 0.5 % (ref 0–0.5)
IMM GRANULOCYTES NFR BLD AUTO: 0.8 % (ref 0–0.5)
INR PPP: 1 (ref 0.8–1.2)
INTERVENTRICULAR SEPTUM: 0.94 CM (ref 0.6–1.1)
IVC DIAMETER: 1.84 CM
IVRT: 64.7 MSEC
KETONES UR QL STRIP: ABNORMAL
KETONES UR QL STRIP: NEGATIVE
LA MAJOR: 5.6 CM
LA MINOR: 4.94 CM
LA WIDTH: 2.6 CM
LACTATE SERPL-SCNC: 3.1 MMOL/L (ref 0.5–2.2)
LACTATE SERPL-SCNC: 3.4 MMOL/L (ref 0.5–2.2)
LEFT ATRIUM SIZE: 2.73 CM
LEFT ATRIUM VOLUME INDEX: 19.1 ML/M2
LEFT ATRIUM VOLUME: 31.67 CM3
LEFT INTERNAL DIMENSION IN SYSTOLE: 2.97 CM (ref 2.1–4)
LEFT VENTRICLE DIASTOLIC VOLUME INDEX: 48.87 ML/M2
LEFT VENTRICLE DIASTOLIC VOLUME: 81.13 ML
LEFT VENTRICLE MASS INDEX: 82 G/M2
LEFT VENTRICLE SYSTOLIC VOLUME INDEX: 20.5 ML/M2
LEFT VENTRICLE SYSTOLIC VOLUME: 34.01 ML
LEFT VENTRICULAR INTERNAL DIMENSION IN DIASTOLE: 4.26 CM (ref 3.5–6)
LEFT VENTRICULAR MASS: 135.53 G
LEUKOCYTE ESTERASE UR QL STRIP: NEGATIVE
LEUKOCYTE ESTERASE UR QL STRIP: NEGATIVE
LV LATERAL E/E' RATIO: 12.89 M/S
LV SEPTAL E/E' RATIO: 14.5 M/S
LVOT MG: 2.21 MMHG
LVOT MV: 0.73 CM/S
LYMPHOCYTES # BLD AUTO: 0.3 K/UL (ref 1–4.8)
LYMPHOCYTES # BLD AUTO: 0.4 K/UL (ref 1–4.8)
LYMPHOCYTES # BLD AUTO: 0.4 K/UL (ref 1–4.8)
LYMPHOCYTES NFR BLD: 12 % (ref 18–48)
LYMPHOCYTES NFR BLD: 3.8 % (ref 18–48)
LYMPHOCYTES NFR BLD: 5 % (ref 18–48)
MAGNESIUM SERPL-MCNC: 1.7 MG/DL (ref 1.6–2.6)
MAGNESIUM SERPL-MCNC: 2 MG/DL (ref 1.6–2.6)
MCH RBC QN AUTO: 31.7 PG (ref 27–31)
MCH RBC QN AUTO: 31.8 PG (ref 27–31)
MCH RBC QN AUTO: 31.9 PG (ref 27–31)
MCHC RBC AUTO-ENTMCNC: 34.2 G/DL (ref 32–36)
MCHC RBC AUTO-ENTMCNC: 34.2 G/DL (ref 32–36)
MCHC RBC AUTO-ENTMCNC: 35 G/DL (ref 32–36)
MCV RBC AUTO: 91 FL (ref 82–98)
MCV RBC AUTO: 93 FL (ref 82–98)
MCV RBC AUTO: 93 FL (ref 82–98)
MICROSCOPIC COMMENT: ABNORMAL
MICROSCOPIC COMMENT: ABNORMAL
MODE: ABNORMAL
MONOCYTES # BLD AUTO: 0.2 K/UL (ref 0.3–1)
MONOCYTES # BLD AUTO: 0.3 K/UL (ref 0.3–1)
MONOCYTES # BLD AUTO: 0.4 K/UL (ref 0.3–1)
MONOCYTES NFR BLD: 3.6 % (ref 4–15)
MONOCYTES NFR BLD: 4 % (ref 4–15)
MONOCYTES NFR BLD: 9 % (ref 4–15)
MV PEAK A VEL: 0.36 M/S
MV PEAK E VEL: 1.16 M/S
MV STENOSIS PRESSURE HALF TIME: 59.6 MS
MV VALVE AREA P 1/2 METHOD: 3.69 CM2
NEUTROPHILS # BLD AUTO: 2 K/UL (ref 1.8–7.7)
NEUTROPHILS # BLD AUTO: 7.1 K/UL (ref 1.8–7.7)
NEUTROPHILS # BLD AUTO: 8.9 K/UL (ref 1.8–7.7)
NEUTROPHILS NFR BLD: 76.4 % (ref 38–73)
NEUTROPHILS NFR BLD: 89.7 % (ref 38–73)
NEUTROPHILS NFR BLD: 91.9 % (ref 38–73)
NITRITE UR QL STRIP: NEGATIVE
NITRITE UR QL STRIP: NEGATIVE
NRBC BLD-RTO: 0 /100 WBC
PCO2 BLDA: 33.3 MMHG (ref 35–45)
PCO2 BLDA: 67.8 MMHG (ref 35–45)
PCO2 BLDA: 73.3 MMHG (ref 35–45)
PCO2 BLDA: 78.5 MMHG (ref 35–45)
PEEP: 5
PEEP: 8
PH SMN: 7.07 [PH] (ref 7.35–7.45)
PH SMN: 7.14 [PH] (ref 7.35–7.45)
PH SMN: 7.2 [PH] (ref 7.35–7.45)
PH SMN: 7.45 [PH] (ref 7.35–7.45)
PH UR STRIP: 6 [PH] (ref 5–8)
PH UR STRIP: 6 [PH] (ref 5–8)
PHOSPHATE SERPL-MCNC: 6.1 MG/DL (ref 2.7–4.5)
PHOSPHATE SERPL-MCNC: 6.7 MG/DL (ref 2.7–4.5)
PISA MRMAX VEL: 4.58 M/S
PISA TR MAX VEL: 3.17 M/S
PLATELET # BLD AUTO: 133 K/UL (ref 150–450)
PLATELET # BLD AUTO: 152 K/UL (ref 150–450)
PLATELET # BLD AUTO: 98 K/UL (ref 150–450)
PLATELET BLD QL SMEAR: ABNORMAL
PLATELET BLD QL SMEAR: ABNORMAL
PMV BLD AUTO: 10.2 FL (ref 9.2–12.9)
PMV BLD AUTO: 10.2 FL (ref 9.2–12.9)
PMV BLD AUTO: 9.2 FL (ref 9.2–12.9)
PO2 BLDA: 45 MMHG (ref 80–100)
PO2 BLDA: 53 MMHG (ref 80–100)
PO2 BLDA: 90 MMHG (ref 80–100)
PO2 BLDA: 99 MMHG (ref 80–100)
POC BE: -1 MMOL/L
POC BE: -2 MMOL/L
POC BE: -4 MMOL/L
POC BE: -8 MMOL/L
POC SATURATED O2: 68 % (ref 95–100)
POC SATURATED O2: 89 % (ref 95–100)
POC SATURATED O2: 92 % (ref 95–100)
POC SATURATED O2: 95 % (ref 95–100)
POCT GLUCOSE: 140 MG/DL (ref 70–110)
POTASSIUM SERPL-SCNC: 3.2 MMOL/L (ref 3.5–5.1)
POTASSIUM SERPL-SCNC: 3.4 MMOL/L (ref 3.5–5.1)
POTASSIUM SERPL-SCNC: 3.5 MMOL/L (ref 3.5–5.1)
POTASSIUM SERPL-SCNC: 3.8 MMOL/L (ref 3.5–5.1)
POTASSIUM SERPL-SCNC: 4 MMOL/L (ref 3.5–5.1)
PROCALCITONIN SERPL IA-MCNC: 6.78 NG/ML
PROT SERPL-MCNC: 5.5 G/DL (ref 6–8.4)
PROT SERPL-MCNC: 6.6 G/DL (ref 6–8.4)
PROT SERPL-MCNC: 7 G/DL (ref 6–8.4)
PROT SERPL-MCNC: 7 G/DL (ref 6–8.4)
PROT UR QL STRIP: ABNORMAL
PROT UR QL STRIP: ABNORMAL
PROT UR-MCNC: 84 MG/DL (ref 0–15)
PROT/CREAT UR: 0.45 MG/G{CREAT} (ref 0–0.2)
PROTHROMBIN TIME: 11.2 SEC (ref 9–12.5)
PV MEAN GRADIENT: 1 MMHG
RA MAJOR: 4.5 CM
RA PRESSURE ESTIMATED: 8 MMHG
RA WIDTH: 2.9 CM
RBC # BLD AUTO: 3.84 M/UL (ref 4–5.4)
RBC # BLD AUTO: 3.92 M/UL (ref 4–5.4)
RBC # BLD AUTO: 4.23 M/UL (ref 4–5.4)
RBC #/AREA URNS AUTO: 3 /HPF (ref 0–4)
RBC #/AREA URNS HPF: 2 /HPF (ref 0–4)
RV TB RVSP: 11 MMHG
SAMPLE: ABNORMAL
SITE: ABNORMAL
SODIUM SERPL-SCNC: 125 MMOL/L (ref 136–145)
SODIUM SERPL-SCNC: 127 MMOL/L (ref 136–145)
SODIUM SERPL-SCNC: 128 MMOL/L (ref 136–145)
SODIUM UR-SCNC: <20 MMOL/L (ref 20–250)
SP GR UR STRIP: 1.02 (ref 1–1.03)
SP GR UR STRIP: >=1.03 (ref 1–1.03)
SQUAMOUS #/AREA URNS AUTO: 2 /HPF
SQUAMOUS #/AREA URNS HPF: 6 /HPF
STJ: 2.67 CM
TDI LATERAL: 0.09 M/S
TDI SEPTAL: 0.08 M/S
TDI: 0.09 M/S
TR MAX PG: 40 MMHG
TRICUSPID ANNULAR PLANE SYSTOLIC EXCURSION: 1.8 CM
TROPONIN I SERPL DL<=0.01 NG/ML-MCNC: 0.1 NG/ML (ref 0–0.03)
TROPONIN I SERPL DL<=0.01 NG/ML-MCNC: 0.11 NG/ML (ref 0–0.03)
TROPONIN I SERPL DL<=0.01 NG/ML-MCNC: 0.18 NG/ML (ref 0–0.03)
TROPONIN I SERPL DL<=0.01 NG/ML-MCNC: 0.19 NG/ML (ref 0–0.03)
TROPONIN I SERPL DL<=0.01 NG/ML-MCNC: 0.48 NG/ML (ref 0–0.03)
TROPONIN I SERPL DL<=0.01 NG/ML-MCNC: 0.64 NG/ML (ref 0–0.03)
TROPONIN I SERPL DL<=0.01 NG/ML-MCNC: 0.66 NG/ML (ref 0–0.03)
TV REST PULMONARY ARTERY PRESSURE: 48 MMHG
URN SPEC COLLECT METH UR: ABNORMAL
URN SPEC COLLECT METH UR: ABNORMAL
UROBILINOGEN UR STRIP-ACNC: <2 EU/DL
UROBILINOGEN UR STRIP-ACNC: NEGATIVE EU/DL
VT: 350
VT: 400
WBC # BLD AUTO: 2.66 K/UL (ref 3.9–12.7)
WBC # BLD AUTO: 7.96 K/UL (ref 3.9–12.7)
WBC # BLD AUTO: 9.64 K/UL (ref 3.9–12.7)
WBC #/AREA URNS AUTO: 1 /HPF (ref 0–5)
WBC #/AREA URNS HPF: 2 /HPF (ref 0–5)
Z-SCORE OF LEFT VENTRICULAR DIMENSION IN END DIASTOLE: -0.88
Z-SCORE OF LEFT VENTRICULAR DIMENSION IN END SYSTOLE: 0.24

## 2023-01-01 PROCEDURE — 27000221 HC OXYGEN, UP TO 24 HOURS

## 2023-01-01 PROCEDURE — 3288F FALL RISK ASSESSMENT DOCD: CPT | Mod: CPTII,S$GLB,, | Performed by: PHYSICIAN ASSISTANT

## 2023-01-01 PROCEDURE — 99203 OFFICE O/P NEW LOW 30 MIN: CPT | Mod: S$GLB,,, | Performed by: PHYSICIAN ASSISTANT

## 2023-01-01 PROCEDURE — 1160F PR REVIEW ALL MEDS BY PRESCRIBER/CLIN PHARMACIST DOCUMENTED: ICD-10-PCS | Mod: CPTII,S$GLB,, | Performed by: ORTHOPAEDIC SURGERY

## 2023-01-01 PROCEDURE — 25000003 PHARM REV CODE 250: Performed by: INTERNAL MEDICINE

## 2023-01-01 PROCEDURE — 96366 THER/PROPH/DIAG IV INF ADDON: CPT | Mod: ER

## 2023-01-01 PROCEDURE — 63600175 PHARM REV CODE 636 W HCPCS: Performed by: NURSE PRACTITIONER

## 2023-01-01 PROCEDURE — 80069 RENAL FUNCTION PANEL: CPT | Performed by: NURSE PRACTITIONER

## 2023-01-01 PROCEDURE — 1125F PR PAIN SEVERITY QUANTIFIED, PAIN PRESENT: ICD-10-PCS | Mod: CPTII,S$GLB,, | Performed by: ORTHOPAEDIC SURGERY

## 2023-01-01 PROCEDURE — 1125F AMNT PAIN NOTED PAIN PRSNT: CPT | Mod: CPTII,S$GLB,, | Performed by: ORTHOPAEDIC SURGERY

## 2023-01-01 PROCEDURE — 83735 ASSAY OF MAGNESIUM: CPT | Performed by: INTERNAL MEDICINE

## 2023-01-01 PROCEDURE — 94640 AIRWAY INHALATION TREATMENT: CPT

## 2023-01-01 PROCEDURE — 87077 CULTURE AEROBIC IDENTIFY: CPT | Performed by: NURSE PRACTITIONER

## 2023-01-01 PROCEDURE — 3288F PR FALLS RISK ASSESSMENT DOCUMENTED: ICD-10-PCS | Mod: CPTII,S$GLB,, | Performed by: ORTHOPAEDIC SURGERY

## 2023-01-01 PROCEDURE — 99999 PR PBB SHADOW E&M-EST. PATIENT-LVL IV: ICD-10-PCS | Mod: PBBFAC,,, | Performed by: PHYSICIAN ASSISTANT

## 2023-01-01 PROCEDURE — 25000242 PHARM REV CODE 250 ALT 637 W/ HCPCS: Performed by: INTERNAL MEDICINE

## 2023-01-01 PROCEDURE — 36600 WITHDRAWAL OF ARTERIAL BLOOD: CPT

## 2023-01-01 PROCEDURE — 96366 THER/PROPH/DIAG IV INF ADDON: CPT

## 2023-01-01 PROCEDURE — 37799 UNLISTED PX VASCULAR SURGERY: CPT

## 2023-01-01 PROCEDURE — 1101F PR PT FALLS ASSESS DOC 0-1 FALLS W/OUT INJ PAST YR: ICD-10-PCS | Mod: CPTII,S$GLB,, | Performed by: ORTHOPAEDIC SURGERY

## 2023-01-01 PROCEDURE — 94761 N-INVAS EAR/PLS OXIMETRY MLT: CPT

## 2023-01-01 PROCEDURE — 1160F RVW MEDS BY RX/DR IN RCRD: CPT | Mod: CPTII,S$GLB,, | Performed by: ORTHOPAEDIC SURGERY

## 2023-01-01 PROCEDURE — 63600175 PHARM REV CODE 636 W HCPCS: Performed by: INTERNAL MEDICINE

## 2023-01-01 PROCEDURE — 96367 TX/PROPH/DG ADDL SEQ IV INF: CPT | Mod: ER

## 2023-01-01 PROCEDURE — 83735 ASSAY OF MAGNESIUM: CPT | Mod: 91 | Performed by: SPECIALIST

## 2023-01-01 PROCEDURE — 85730 THROMBOPLASTIN TIME PARTIAL: CPT | Performed by: INTERNAL MEDICINE

## 2023-01-01 PROCEDURE — 1101F PT FALLS ASSESS-DOCD LE1/YR: CPT | Mod: CPTII,S$GLB,, | Performed by: PHYSICIAN ASSISTANT

## 2023-01-01 PROCEDURE — 36415 COLL VENOUS BLD VENIPUNCTURE: CPT | Performed by: INTERNAL MEDICINE

## 2023-01-01 PROCEDURE — 25000003 PHARM REV CODE 250

## 2023-01-01 PROCEDURE — 99999 PR PBB SHADOW E&M-EST. PATIENT-LVL III: CPT | Mod: PBBFAC,,, | Performed by: ORTHOPAEDIC SURGERY

## 2023-01-01 PROCEDURE — 94002 VENT MGMT INPAT INIT DAY: CPT

## 2023-01-01 PROCEDURE — 73110 X-RAY EXAM OF WRIST: CPT | Mod: TC,LT

## 2023-01-01 PROCEDURE — 82570 ASSAY OF URINE CREATININE: CPT | Performed by: SPECIALIST

## 2023-01-01 PROCEDURE — 99285 EMERGENCY DEPT VISIT HI MDM: CPT | Mod: ER

## 2023-01-01 PROCEDURE — 81000 URINALYSIS NONAUTO W/SCOPE: CPT | Performed by: SPECIALIST

## 2023-01-01 PROCEDURE — 99213 OFFICE O/P EST LOW 20 MIN: CPT | Mod: S$GLB,,, | Performed by: ORTHOPAEDIC SURGERY

## 2023-01-01 PROCEDURE — 94660 CPAP INITIATION&MGMT: CPT | Mod: XB

## 2023-01-01 PROCEDURE — 96375 TX/PRO/DX INJ NEW DRUG ADDON: CPT

## 2023-01-01 PROCEDURE — 96376 TX/PRO/DX INJ SAME DRUG ADON: CPT | Mod: ER

## 2023-01-01 PROCEDURE — 73110 X-RAY EXAM OF WRIST: CPT | Mod: 26,LT,, | Performed by: RADIOLOGY

## 2023-01-01 PROCEDURE — 80053 COMPREHEN METABOLIC PANEL: CPT | Performed by: INTERNAL MEDICINE

## 2023-01-01 PROCEDURE — 94644 CONT INHLJ TX 1ST HOUR: CPT

## 2023-01-01 PROCEDURE — 83605 ASSAY OF LACTIC ACID: CPT | Performed by: SPECIALIST

## 2023-01-01 PROCEDURE — 82803 BLOOD GASES ANY COMBINATION: CPT

## 2023-01-01 PROCEDURE — 25000003 PHARM REV CODE 250: Performed by: EMERGENCY MEDICINE

## 2023-01-01 PROCEDURE — 99900035 HC TECH TIME PER 15 MIN (STAT)

## 2023-01-01 PROCEDURE — 31500 INSERT EMERGENCY AIRWAY: CPT

## 2023-01-01 PROCEDURE — 1159F PR MEDICATION LIST DOCUMENTED IN MEDICAL RECORD: ICD-10-PCS | Mod: CPTII,S$GLB,, | Performed by: PHYSICIAN ASSISTANT

## 2023-01-01 PROCEDURE — 93010 EKG 12-LEAD: ICD-10-PCS | Mod: ,,, | Performed by: INTERNAL MEDICINE

## 2023-01-01 PROCEDURE — 1159F PR MEDICATION LIST DOCUMENTED IN MEDICAL RECORD: ICD-10-PCS | Mod: CPTII,S$GLB,, | Performed by: ORTHOPAEDIC SURGERY

## 2023-01-01 PROCEDURE — 3288F FALL RISK ASSESSMENT DOCD: CPT | Mod: CPTII,S$GLB,, | Performed by: ORTHOPAEDIC SURGERY

## 2023-01-01 PROCEDURE — 63600175 PHARM REV CODE 636 W HCPCS: Performed by: SPECIALIST

## 2023-01-01 PROCEDURE — 84484 ASSAY OF TROPONIN QUANT: CPT | Mod: 91,ER | Performed by: EMERGENCY MEDICINE

## 2023-01-01 PROCEDURE — 93010 ELECTROCARDIOGRAM REPORT: CPT | Mod: ,,, | Performed by: INTERNAL MEDICINE

## 2023-01-01 PROCEDURE — 84484 ASSAY OF TROPONIN QUANT: CPT | Performed by: INTERNAL MEDICINE

## 2023-01-01 PROCEDURE — 4010F PR ACE/ARB THEARPY RXD/TAKEN: ICD-10-PCS | Mod: CPTII,S$GLB,, | Performed by: PHYSICIAN ASSISTANT

## 2023-01-01 PROCEDURE — 3288F PR FALLS RISK ASSESSMENT DOCUMENTED: ICD-10-PCS | Mod: CPTII,S$GLB,, | Performed by: PHYSICIAN ASSISTANT

## 2023-01-01 PROCEDURE — 3008F PR BODY MASS INDEX (BMI) DOCUMENTED: ICD-10-PCS | Mod: CPTII,S$GLB,, | Performed by: ORTHOPAEDIC SURGERY

## 2023-01-01 PROCEDURE — 94640 AIRWAY INHALATION TREATMENT: CPT | Mod: ER,XB

## 2023-01-01 PROCEDURE — 99291 PR CRITICAL CARE, E/M 30-74 MINUTES: ICD-10-PCS | Mod: ,,, | Performed by: INTERNAL MEDICINE

## 2023-01-01 PROCEDURE — 25000003 PHARM REV CODE 250: Performed by: NURSE PRACTITIONER

## 2023-01-01 PROCEDURE — 1126F PR PAIN SEVERITY QUANTIFIED, NO PAIN PRESENT: ICD-10-PCS | Mod: CPTII,S$GLB,, | Performed by: ORTHOPAEDIC SURGERY

## 2023-01-01 PROCEDURE — 99291 CRITICAL CARE FIRST HOUR: CPT | Mod: ER

## 2023-01-01 PROCEDURE — 25000003 PHARM REV CODE 250: Performed by: SPECIALIST

## 2023-01-01 PROCEDURE — 83605 ASSAY OF LACTIC ACID: CPT | Mod: 91 | Performed by: NURSE PRACTITIONER

## 2023-01-01 PROCEDURE — 84484 ASSAY OF TROPONIN QUANT: CPT | Mod: 91 | Performed by: INTERNAL MEDICINE

## 2023-01-01 PROCEDURE — 63600175 PHARM REV CODE 636 W HCPCS: Mod: ER | Performed by: NURSE PRACTITIONER

## 2023-01-01 PROCEDURE — 99213 PR OFFICE/OUTPT VISIT, EST, LEVL III, 20-29 MIN: ICD-10-PCS | Mod: S$GLB,,, | Performed by: ORTHOPAEDIC SURGERY

## 2023-01-01 PROCEDURE — 36556 INSERT NON-TUNNEL CV CATH: CPT

## 2023-01-01 PROCEDURE — 81000 URINALYSIS NONAUTO W/SCOPE: CPT | Mod: ER | Performed by: EMERGENCY MEDICINE

## 2023-01-01 PROCEDURE — 94761 N-INVAS EAR/PLS OXIMETRY MLT: CPT | Mod: ER

## 2023-01-01 PROCEDURE — 84300 ASSAY OF URINE SODIUM: CPT | Performed by: SPECIALIST

## 2023-01-01 PROCEDURE — 85025 COMPLETE CBC W/AUTO DIFF WBC: CPT | Performed by: NURSE PRACTITIONER

## 2023-01-01 PROCEDURE — 1125F PR PAIN SEVERITY QUANTIFIED, PAIN PRESENT: ICD-10-PCS | Mod: CPTII,S$GLB,, | Performed by: PHYSICIAN ASSISTANT

## 2023-01-01 PROCEDURE — 1159F MED LIST DOCD IN RCRD: CPT | Mod: CPTII,S$GLB,, | Performed by: ORTHOPAEDIC SURGERY

## 2023-01-01 PROCEDURE — 80053 COMPREHEN METABOLIC PANEL: CPT | Mod: 91 | Performed by: SPECIALIST

## 2023-01-01 PROCEDURE — S4991 NICOTINE PATCH NONLEGEND: HCPCS | Performed by: INTERNAL MEDICINE

## 2023-01-01 PROCEDURE — 84145 PROCALCITONIN (PCT): CPT | Performed by: SPECIALIST

## 2023-01-01 PROCEDURE — 99291 CRITICAL CARE FIRST HOUR: CPT | Mod: ,,, | Performed by: INTERNAL MEDICINE

## 2023-01-01 PROCEDURE — G0378 HOSPITAL OBSERVATION PER HR: HCPCS

## 2023-01-01 PROCEDURE — 93010 EKG 12-LEAD: ICD-10-PCS | Mod: 76,,, | Performed by: INTERNAL MEDICINE

## 2023-01-01 PROCEDURE — 85730 THROMBOPLASTIN TIME PARTIAL: CPT | Performed by: NURSE PRACTITIONER

## 2023-01-01 PROCEDURE — 85730 THROMBOPLASTIN TIME PARTIAL: CPT | Mod: 91,ER | Performed by: NURSE PRACTITIONER

## 2023-01-01 PROCEDURE — 99999 PR PBB SHADOW E&M-EST. PATIENT-LVL III: ICD-10-PCS | Mod: PBBFAC,,, | Performed by: ORTHOPAEDIC SURGERY

## 2023-01-01 PROCEDURE — 1101F PR PT FALLS ASSESS DOC 0-1 FALLS W/OUT INJ PAST YR: ICD-10-PCS | Mod: CPTII,S$GLB,, | Performed by: PHYSICIAN ASSISTANT

## 2023-01-01 PROCEDURE — 87040 BLOOD CULTURE FOR BACTERIA: CPT | Performed by: NURSE PRACTITIONER

## 2023-01-01 PROCEDURE — C9113 INJ PANTOPRAZOLE SODIUM, VIA: HCPCS | Performed by: NURSE PRACTITIONER

## 2023-01-01 PROCEDURE — 93010 ELECTROCARDIOGRAM REPORT: CPT | Mod: 76,,, | Performed by: INTERNAL MEDICINE

## 2023-01-01 PROCEDURE — 85610 PROTHROMBIN TIME: CPT | Mod: ER | Performed by: NURSE PRACTITIONER

## 2023-01-01 PROCEDURE — 25000003 PHARM REV CODE 250: Mod: ER | Performed by: EMERGENCY MEDICINE

## 2023-01-01 PROCEDURE — 93005 ELECTROCARDIOGRAM TRACING: CPT

## 2023-01-01 PROCEDURE — 82140 ASSAY OF AMMONIA: CPT | Performed by: NURSE PRACTITIONER

## 2023-01-01 PROCEDURE — 4010F ACE/ARB THERAPY RXD/TAKEN: CPT | Mod: CPTII,S$GLB,, | Performed by: PHYSICIAN ASSISTANT

## 2023-01-01 PROCEDURE — 85379 FIBRIN DEGRADATION QUANT: CPT | Performed by: INTERNAL MEDICINE

## 2023-01-01 PROCEDURE — 27100108

## 2023-01-01 PROCEDURE — 85025 COMPLETE CBC W/AUTO DIFF WBC: CPT | Mod: ER | Performed by: NURSE PRACTITIONER

## 2023-01-01 PROCEDURE — 87081 CULTURE SCREEN ONLY: CPT | Performed by: NURSE PRACTITIONER

## 2023-01-01 PROCEDURE — 93005 ELECTROCARDIOGRAM TRACING: CPT | Mod: ER

## 2023-01-01 PROCEDURE — 63600175 PHARM REV CODE 636 W HCPCS

## 2023-01-01 PROCEDURE — 1126F AMNT PAIN NOTED NONE PRSNT: CPT | Mod: CPTII,S$GLB,, | Performed by: ORTHOPAEDIC SURGERY

## 2023-01-01 PROCEDURE — 4010F ACE/ARB THERAPY RXD/TAKEN: CPT | Mod: CPTII,S$GLB,, | Performed by: ORTHOPAEDIC SURGERY

## 2023-01-01 PROCEDURE — 96376 TX/PRO/DX INJ SAME DRUG ADON: CPT

## 2023-01-01 PROCEDURE — 80053 COMPREHEN METABOLIC PANEL: CPT | Mod: ER | Performed by: EMERGENCY MEDICINE

## 2023-01-01 PROCEDURE — 87186 SC STD MICRODIL/AGAR DIL: CPT | Performed by: NURSE PRACTITIONER

## 2023-01-01 PROCEDURE — 1101F PT FALLS ASSESS-DOCD LE1/YR: CPT | Mod: CPTII,S$GLB,, | Performed by: ORTHOPAEDIC SURGERY

## 2023-01-01 PROCEDURE — 96365 THER/PROPH/DIAG IV INF INIT: CPT | Mod: ER

## 2023-01-01 PROCEDURE — 3008F BODY MASS INDEX DOCD: CPT | Mod: CPTII,S$GLB,, | Performed by: ORTHOPAEDIC SURGERY

## 2023-01-01 PROCEDURE — 36620 INSERTION CATHETER ARTERY: CPT

## 2023-01-01 PROCEDURE — 99203 PR OFFICE/OUTPT VISIT, NEW, LEVL III, 30-44 MIN: ICD-10-PCS | Mod: S$GLB,,, | Performed by: ORTHOPAEDIC SURGERY

## 2023-01-01 PROCEDURE — 25000242 PHARM REV CODE 250 ALT 637 W/ HCPCS: Mod: ER | Performed by: EMERGENCY MEDICINE

## 2023-01-01 PROCEDURE — 83880 ASSAY OF NATRIURETIC PEPTIDE: CPT | Mod: ER | Performed by: EMERGENCY MEDICINE

## 2023-01-01 PROCEDURE — 1125F AMNT PAIN NOTED PAIN PRSNT: CPT | Mod: CPTII,S$GLB,, | Performed by: PHYSICIAN ASSISTANT

## 2023-01-01 PROCEDURE — 1159F MED LIST DOCD IN RCRD: CPT | Mod: CPTII,S$GLB,, | Performed by: PHYSICIAN ASSISTANT

## 2023-01-01 PROCEDURE — 99203 OFFICE O/P NEW LOW 30 MIN: CPT | Mod: S$GLB,,, | Performed by: ORTHOPAEDIC SURGERY

## 2023-01-01 PROCEDURE — 99999 PR PBB SHADOW E&M-EST. PATIENT-LVL IV: CPT | Mod: PBBFAC,,, | Performed by: PHYSICIAN ASSISTANT

## 2023-01-01 PROCEDURE — C1751 CATH, INF, PER/CENT/MIDLINE: HCPCS

## 2023-01-01 PROCEDURE — 99203 PR OFFICE/OUTPT VISIT, NEW, LEVL III, 30-44 MIN: ICD-10-PCS | Mod: S$GLB,,, | Performed by: PHYSICIAN ASSISTANT

## 2023-01-01 PROCEDURE — 25606 PERQ SKEL FIXJ DSTL RDL FX: CPT | Mod: LT,ER

## 2023-01-01 PROCEDURE — C9399 UNCLASSIFIED DRUGS OR BIOLOG: HCPCS | Performed by: NURSE PRACTITIONER

## 2023-01-01 PROCEDURE — 63600175 PHARM REV CODE 636 W HCPCS: Mod: ER | Performed by: EMERGENCY MEDICINE

## 2023-01-01 PROCEDURE — 84484 ASSAY OF TROPONIN QUANT: CPT | Mod: 91 | Performed by: NURSE PRACTITIONER

## 2023-01-01 PROCEDURE — 20000000 HC ICU ROOM

## 2023-01-01 PROCEDURE — 27100171 HC OXYGEN HIGH FLOW UP TO 24 HOURS

## 2023-01-01 PROCEDURE — 25000003 PHARM REV CODE 250: Mod: ER | Performed by: INTERNAL MEDICINE

## 2023-01-01 PROCEDURE — 73110 X-RAY EXAM OF WRIST: CPT | Mod: 26,LT,, | Performed by: STUDENT IN AN ORGANIZED HEALTH CARE EDUCATION/TRAINING PROGRAM

## 2023-01-01 PROCEDURE — 73110 XR WRIST COMPLETE 3 VIEWS LEFT: ICD-10-PCS | Mod: 26,LT,, | Performed by: RADIOLOGY

## 2023-01-01 PROCEDURE — 27000190 HC CPAP FULL FACE MASK W/VALVE

## 2023-01-01 PROCEDURE — 3008F PR BODY MASS INDEX (BMI) DOCUMENTED: ICD-10-PCS | Mod: CPTII,S$GLB,, | Performed by: PHYSICIAN ASSISTANT

## 2023-01-01 PROCEDURE — 83880 ASSAY OF NATRIURETIC PEPTIDE: CPT | Performed by: INTERNAL MEDICINE

## 2023-01-01 PROCEDURE — 85025 COMPLETE CBC W/AUTO DIFF WBC: CPT | Mod: ER | Performed by: EMERGENCY MEDICINE

## 2023-01-01 PROCEDURE — 73110 XR WRIST COMPLETE 3 VIEWS LEFT: ICD-10-PCS | Mod: 26,LT,, | Performed by: STUDENT IN AN ORGANIZED HEALTH CARE EDUCATION/TRAINING PROGRAM

## 2023-01-01 PROCEDURE — 1100F PTFALLS ASSESS-DOCD GE2>/YR: CPT | Mod: CPTII,S$GLB,, | Performed by: ORTHOPAEDIC SURGERY

## 2023-01-01 PROCEDURE — 4010F PR ACE/ARB THEARPY RXD/TAKEN: ICD-10-PCS | Mod: CPTII,S$GLB,, | Performed by: ORTHOPAEDIC SURGERY

## 2023-01-01 PROCEDURE — 84100 ASSAY OF PHOSPHORUS: CPT | Performed by: SPECIALIST

## 2023-01-01 PROCEDURE — 51702 INSERT TEMP BLADDER CATH: CPT

## 2023-01-01 PROCEDURE — 3008F BODY MASS INDEX DOCD: CPT | Mod: CPTII,S$GLB,, | Performed by: PHYSICIAN ASSISTANT

## 2023-01-01 PROCEDURE — 27200966 HC CLOSED SUCTION SYSTEM

## 2023-01-01 PROCEDURE — 1100F PR PT FALLS ASSESS DOC 2+ FALLS/FALL W/INJURY/YR: ICD-10-PCS | Mod: CPTII,S$GLB,, | Performed by: ORTHOPAEDIC SURGERY

## 2023-01-01 RX ORDER — DIGOXIN 0.25 MG/ML
250 INJECTION INTRAMUSCULAR; INTRAVENOUS ONCE
Status: COMPLETED | OUTPATIENT
Start: 2023-01-01 | End: 2023-01-01

## 2023-01-01 RX ORDER — TALC
6 POWDER (GRAM) TOPICAL NIGHTLY PRN
Status: DISCONTINUED | OUTPATIENT
Start: 2023-01-01 | End: 2023-01-01 | Stop reason: HOSPADM

## 2023-01-01 RX ORDER — SODIUM,POTASSIUM PHOSPHATES 280-250MG
2 POWDER IN PACKET (EA) ORAL
Status: DISCONTINUED | OUTPATIENT
Start: 2023-01-01 | End: 2023-01-01

## 2023-01-01 RX ORDER — ONDANSETRON 2 MG/ML
4 INJECTION INTRAMUSCULAR; INTRAVENOUS EVERY 8 HOURS PRN
Status: DISCONTINUED | OUTPATIENT
Start: 2023-01-01 | End: 2023-01-01 | Stop reason: HOSPADM

## 2023-01-01 RX ORDER — GLUCAGON 1 MG
1 KIT INJECTION
Status: DISCONTINUED | OUTPATIENT
Start: 2023-01-01 | End: 2023-01-01 | Stop reason: HOSPADM

## 2023-01-01 RX ORDER — LANOLIN ALCOHOL/MO/W.PET/CERES
800 CREAM (GRAM) TOPICAL
Status: DISCONTINUED | OUTPATIENT
Start: 2023-01-01 | End: 2023-01-01 | Stop reason: HOSPADM

## 2023-01-01 RX ORDER — MUPIROCIN 20 MG/G
OINTMENT TOPICAL 2 TIMES DAILY
Status: DISCONTINUED | OUTPATIENT
Start: 2023-01-01 | End: 2023-01-01 | Stop reason: HOSPADM

## 2023-01-01 RX ORDER — NALOXONE HCL 0.4 MG/ML
0.02 VIAL (ML) INJECTION
Status: DISCONTINUED | OUTPATIENT
Start: 2023-01-01 | End: 2023-01-01 | Stop reason: HOSPADM

## 2023-01-01 RX ORDER — SIMETHICONE 80 MG
1 TABLET,CHEWABLE ORAL 4 TIMES DAILY PRN
Status: DISCONTINUED | OUTPATIENT
Start: 2023-01-01 | End: 2023-01-01 | Stop reason: HOSPADM

## 2023-01-01 RX ORDER — DEXMEDETOMIDINE HYDROCHLORIDE 4 UG/ML
0-1.4 INJECTION INTRAVENOUS CONTINUOUS
Status: DISCONTINUED | OUTPATIENT
Start: 2023-01-01 | End: 2023-01-01 | Stop reason: HOSPADM

## 2023-01-01 RX ORDER — INDOMETHACIN 25 MG/1
50 CAPSULE ORAL ONCE
Status: COMPLETED | OUTPATIENT
Start: 2023-01-01 | End: 2023-01-01

## 2023-01-01 RX ORDER — LEVALBUTEROL INHALATION SOLUTION 0.63 MG/3ML
1.25 SOLUTION RESPIRATORY (INHALATION) EVERY 6 HOURS
Status: DISCONTINUED | OUTPATIENT
Start: 2023-01-01 | End: 2023-01-01

## 2023-01-01 RX ORDER — DILTIAZEM HYDROCHLORIDE 5 MG/ML
0.25 INJECTION INTRAVENOUS
Status: COMPLETED | OUTPATIENT
Start: 2023-01-01 | End: 2023-01-01

## 2023-01-01 RX ORDER — FENTANYL CITRATE-0.9 % NACL/PF 10 MCG/ML
0-200 PLASTIC BAG, INJECTION (ML) INTRAVENOUS CONTINUOUS
Status: DISCONTINUED | OUTPATIENT
Start: 2023-01-01 | End: 2023-01-01 | Stop reason: HOSPADM

## 2023-01-01 RX ORDER — POLYETHYLENE GLYCOL 3350 17 G/17G
17 POWDER, FOR SOLUTION ORAL DAILY
Status: DISCONTINUED | OUTPATIENT
Start: 2023-01-01 | End: 2023-01-01

## 2023-01-01 RX ORDER — IPRATROPIUM BROMIDE AND ALBUTEROL SULFATE 2.5; .5 MG/3ML; MG/3ML
3 SOLUTION RESPIRATORY (INHALATION)
Status: COMPLETED | OUTPATIENT
Start: 2023-01-01 | End: 2023-01-01

## 2023-01-01 RX ORDER — LEVALBUTEROL INHALATION SOLUTION 0.63 MG/3ML
1.25 SOLUTION RESPIRATORY (INHALATION) EVERY 6 HOURS
Status: DISCONTINUED | OUTPATIENT
Start: 2023-01-01 | End: 2023-01-01 | Stop reason: HOSPADM

## 2023-01-01 RX ORDER — DILTIAZEM HCL 1 MG/ML
0-15 INJECTION, SOLUTION INTRAVENOUS CONTINUOUS
Status: DISCONTINUED | OUTPATIENT
Start: 2023-01-01 | End: 2023-01-01

## 2023-01-01 RX ORDER — IPRATROPIUM BROMIDE 0.5 MG/2.5ML
0.5 SOLUTION RESPIRATORY (INHALATION) EVERY 6 HOURS
Status: DISCONTINUED | OUTPATIENT
Start: 2023-01-01 | End: 2023-01-01

## 2023-01-01 RX ORDER — FUROSEMIDE 10 MG/ML
40 INJECTION INTRAMUSCULAR; INTRAVENOUS ONCE
Status: DISCONTINUED | OUTPATIENT
Start: 2023-01-01 | End: 2023-01-01

## 2023-01-01 RX ORDER — INDOMETHACIN 25 MG/1
CAPSULE ORAL
Status: COMPLETED
Start: 2023-01-01 | End: 2023-01-01

## 2023-01-01 RX ORDER — DIGOXIN 0.25 MG/ML
INJECTION INTRAMUSCULAR; INTRAVENOUS
Status: COMPLETED
Start: 2023-01-01 | End: 2023-01-01

## 2023-01-01 RX ORDER — PREDNISONE 20 MG/1
40 TABLET ORAL ONCE
Status: COMPLETED | OUTPATIENT
Start: 2023-01-01 | End: 2023-01-01

## 2023-01-01 RX ORDER — LINEZOLID 2 MG/ML
600 INJECTION, SOLUTION INTRAVENOUS
Status: DISCONTINUED | OUTPATIENT
Start: 2023-01-01 | End: 2023-01-01 | Stop reason: HOSPADM

## 2023-01-01 RX ORDER — BISACODYL 10 MG
10 SUPPOSITORY, RECTAL RECTAL DAILY PRN
Status: DISCONTINUED | OUTPATIENT
Start: 2023-01-01 | End: 2023-01-01 | Stop reason: HOSPADM

## 2023-01-01 RX ORDER — ESMOLOL HYDROCHLORIDE 10 MG/ML
0.5 INJECTION INTRAVENOUS ONCE
Status: COMPLETED | OUTPATIENT
Start: 2023-01-01 | End: 2023-01-01

## 2023-01-01 RX ORDER — ETOMIDATE 2 MG/ML
20 INJECTION INTRAVENOUS ONCE
Status: COMPLETED | OUTPATIENT
Start: 2023-01-01 | End: 2023-01-01

## 2023-01-01 RX ORDER — CHLORHEXIDINE GLUCONATE ORAL RINSE 1.2 MG/ML
15 SOLUTION DENTAL 2 TIMES DAILY
Status: DISCONTINUED | OUTPATIENT
Start: 2023-01-01 | End: 2023-01-01 | Stop reason: HOSPADM

## 2023-01-01 RX ORDER — NOREPINEPHRINE BITARTRATE/D5W 4MG/250ML
0-3 PLASTIC BAG, INJECTION (ML) INTRAVENOUS CONTINUOUS
Status: DISCONTINUED | OUTPATIENT
Start: 2023-01-01 | End: 2023-01-01

## 2023-01-01 RX ORDER — HYDROCODONE BITARTRATE AND ACETAMINOPHEN 5; 325 MG/1; MG/1
1 TABLET ORAL
Status: COMPLETED | OUTPATIENT
Start: 2023-01-01 | End: 2023-01-01

## 2023-01-01 RX ORDER — ROCURONIUM BROMIDE 10 MG/ML
70 INJECTION, SOLUTION INTRAVENOUS ONCE
Status: COMPLETED | OUTPATIENT
Start: 2023-01-01 | End: 2023-01-01

## 2023-01-01 RX ORDER — BUDESONIDE 0.5 MG/2ML
0.5 INHALANT ORAL EVERY 12 HOURS
Status: DISCONTINUED | OUTPATIENT
Start: 2023-01-01 | End: 2023-01-01 | Stop reason: HOSPADM

## 2023-01-01 RX ORDER — HEPARIN SODIUM,PORCINE/D5W 25000/250
0-40 INTRAVENOUS SOLUTION INTRAVENOUS CONTINUOUS
Status: DISCONTINUED | OUTPATIENT
Start: 2023-01-01 | End: 2023-01-01 | Stop reason: HOSPADM

## 2023-01-01 RX ORDER — ETOMIDATE 2 MG/ML
INJECTION INTRAVENOUS
Status: COMPLETED
Start: 2023-01-01 | End: 2023-01-01

## 2023-01-01 RX ORDER — ALBUTEROL SULFATE 0.83 MG/ML
2.5 SOLUTION RESPIRATORY (INHALATION) EVERY 4 HOURS PRN
Status: DISCONTINUED | OUTPATIENT
Start: 2023-01-01 | End: 2023-01-01 | Stop reason: HOSPADM

## 2023-01-01 RX ORDER — SODIUM CHLORIDE 0.9 % (FLUSH) 0.9 %
10 SYRINGE (ML) INJECTION
Status: DISCONTINUED | OUTPATIENT
Start: 2023-01-01 | End: 2023-01-01 | Stop reason: HOSPADM

## 2023-01-01 RX ORDER — PANTOPRAZOLE SODIUM 40 MG/10ML
40 INJECTION, POWDER, LYOPHILIZED, FOR SOLUTION INTRAVENOUS DAILY
Status: DISCONTINUED | OUTPATIENT
Start: 2023-01-01 | End: 2023-01-01 | Stop reason: HOSPADM

## 2023-01-01 RX ORDER — HYDROCODONE BITARTRATE AND ACETAMINOPHEN 5; 325 MG/1; MG/1
1 TABLET ORAL EVERY 6 HOURS PRN
Qty: 18 TABLET | Refills: 0 | Status: SHIPPED | OUTPATIENT
Start: 2023-01-01 | End: 2023-01-01

## 2023-01-01 RX ORDER — ROCURONIUM BROMIDE 10 MG/ML
INJECTION, SOLUTION INTRAVENOUS
Status: COMPLETED
Start: 2023-01-01 | End: 2023-01-01

## 2023-01-01 RX ORDER — LACTULOSE 10 G/15ML
30 SOLUTION ORAL 3 TIMES DAILY
Status: DISCONTINUED | OUTPATIENT
Start: 2023-01-01 | End: 2023-01-01 | Stop reason: HOSPADM

## 2023-01-01 RX ORDER — ACETAMINOPHEN 325 MG/1
650 TABLET ORAL EVERY 4 HOURS PRN
Status: DISCONTINUED | OUTPATIENT
Start: 2023-01-01 | End: 2023-01-01 | Stop reason: HOSPADM

## 2023-01-01 RX ORDER — IBUPROFEN 200 MG
1 TABLET ORAL DAILY
Status: DISCONTINUED | OUTPATIENT
Start: 2023-01-01 | End: 2023-01-01 | Stop reason: HOSPADM

## 2023-01-01 RX ORDER — NOREPINEPHRINE BITARTRATE/D5W 4MG/250ML
PLASTIC BAG, INJECTION (ML) INTRAVENOUS
Status: COMPLETED
Start: 2023-01-01 | End: 2023-01-01

## 2023-01-01 RX ORDER — PANTOPRAZOLE SODIUM 40 MG/1
40 TABLET, DELAYED RELEASE ORAL DAILY
Status: DISCONTINUED | OUTPATIENT
Start: 2023-01-01 | End: 2023-01-01

## 2023-01-01 RX ORDER — MIDAZOLAM HYDROCHLORIDE 1 MG/ML
1 INJECTION INTRAMUSCULAR; INTRAVENOUS ONCE
Status: DISCONTINUED | OUTPATIENT
Start: 2023-01-01 | End: 2023-01-01 | Stop reason: HOSPADM

## 2023-01-01 RX ORDER — INSULIN ASPART 100 [IU]/ML
0-10 INJECTION, SOLUTION INTRAVENOUS; SUBCUTANEOUS EVERY 6 HOURS PRN
Status: DISCONTINUED | OUTPATIENT
Start: 2023-01-01 | End: 2023-01-01 | Stop reason: HOSPADM

## 2023-01-01 RX ORDER — ONDANSETRON 8 MG/1
8 TABLET, ORALLY DISINTEGRATING ORAL EVERY 8 HOURS PRN
Status: DISCONTINUED | OUTPATIENT
Start: 2023-01-01 | End: 2023-01-01 | Stop reason: HOSPADM

## 2023-01-01 RX ORDER — VASOPRESSIN IN DEXTROSE 5 % 25/250 ML
PLASTIC BAG, INJECTION (ML) INTRAVENOUS
Status: COMPLETED
Start: 2023-01-01 | End: 2023-01-01

## 2023-01-01 RX ORDER — DILTIAZEM HCL/D5W 125 MG/125
5 PLASTIC BAG, INJECTION (ML) INTRAVENOUS CONTINUOUS
Status: CANCELLED | OUTPATIENT
Start: 2023-01-01

## 2023-01-01 RX ORDER — IPRATROPIUM BROMIDE 0.5 MG/2.5ML
0.5 SOLUTION RESPIRATORY (INHALATION) EVERY 6 HOURS
Status: DISCONTINUED | OUTPATIENT
Start: 2023-01-01 | End: 2023-01-01 | Stop reason: HOSPADM

## 2023-01-01 RX ORDER — ATORVASTATIN CALCIUM 10 MG/1
20 TABLET, FILM COATED ORAL DAILY
Status: DISCONTINUED | OUTPATIENT
Start: 2023-01-01 | End: 2023-01-01

## 2023-01-01 RX ORDER — VASOPRESSIN IN DEXTROSE 5 % 25/250 ML
0.04 PLASTIC BAG, INJECTION (ML) INTRAVENOUS CONTINUOUS
Status: DISCONTINUED | OUTPATIENT
Start: 2023-01-01 | End: 2023-01-01 | Stop reason: HOSPADM

## 2023-01-01 RX ORDER — HYDROCODONE BITARTRATE AND ACETAMINOPHEN 5; 325 MG/1; MG/1
1 TABLET ORAL EVERY 6 HOURS PRN
Status: DISCONTINUED | OUTPATIENT
Start: 2023-01-01 | End: 2023-01-01 | Stop reason: HOSPADM

## 2023-01-01 RX ORDER — SERTRALINE HYDROCHLORIDE 25 MG/1
25 TABLET, FILM COATED ORAL DAILY
Status: DISCONTINUED | OUTPATIENT
Start: 2023-01-01 | End: 2023-01-01

## 2023-01-01 RX ORDER — MAG HYDROX/ALUMINUM HYD/SIMETH 200-200-20
30 SUSPENSION, ORAL (FINAL DOSE FORM) ORAL 4 TIMES DAILY PRN
Status: DISCONTINUED | OUTPATIENT
Start: 2023-01-01 | End: 2023-01-01 | Stop reason: HOSPADM

## 2023-01-01 RX ADMIN — HYDROCODONE BITARTRATE AND ACETAMINOPHEN 1 TABLET: 5; 325 TABLET ORAL at 08:08

## 2023-01-01 RX ADMIN — DIGOXIN 250 MCG: 250 INJECTION, SOLUTION INTRAMUSCULAR; INTRAVENOUS at 01:08

## 2023-01-01 RX ADMIN — IPRATROPIUM BROMIDE 0.5 MG: 0.5 SOLUTION RESPIRATORY (INHALATION) at 06:08

## 2023-01-01 RX ADMIN — NOREPINEPHRINE BITARTRATE 1.5 MCG/KG/MIN: 1 INJECTION, SOLUTION, CONCENTRATE INTRAVENOUS at 04:08

## 2023-01-01 RX ADMIN — METHYLPREDNISOLONE SODIUM SUCCINATE 80 MG: 40 INJECTION, POWDER, FOR SOLUTION INTRAMUSCULAR; INTRAVENOUS at 12:08

## 2023-01-01 RX ADMIN — AMIODARONE HYDROCHLORIDE 1 MG/MIN: 1.8 INJECTION, SOLUTION INTRAVENOUS at 10:08

## 2023-01-01 RX ADMIN — Medication 6 MG: at 12:08

## 2023-01-01 RX ADMIN — DIGOXIN 250 MCG: 0.25 INJECTION INTRAMUSCULAR; INTRAVENOUS at 01:08

## 2023-01-01 RX ADMIN — LEVALBUTEROL HYDROCHLORIDE 1.25 MG: 0.63 SOLUTION RESPIRATORY (INHALATION) at 10:08

## 2023-01-01 RX ADMIN — 0.12% CHLORHEXIDINE GLUCONATE 15 ML: 1.2 RINSE ORAL at 10:08

## 2023-01-01 RX ADMIN — DILTIAZEM HYDROCHLORIDE 15.5 MG: 5 INJECTION INTRAVENOUS at 08:08

## 2023-01-01 RX ADMIN — LEVALBUTEROL HYDROCHLORIDE 1.25 MG: 0.63 SOLUTION RESPIRATORY (INHALATION) at 12:08

## 2023-01-01 RX ADMIN — HYDROCODONE BITARTRATE AND ACETAMINOPHEN 1 TABLET: 5; 325 TABLET ORAL at 12:08

## 2023-01-01 RX ADMIN — SODIUM BICARBONATE 50 MEQ: 84 INJECTION, SOLUTION INTRAVENOUS at 01:08

## 2023-01-01 RX ADMIN — LINEZOLID 600 MG: 600 INJECTION, SOLUTION INTRAVENOUS at 11:08

## 2023-01-01 RX ADMIN — DEXMEDETOMIDINE HYDROCHLORIDE 0.2 MCG/KG/HR: 4 INJECTION INTRAVENOUS at 09:08

## 2023-01-01 RX ADMIN — LEVALBUTEROL HYDROCHLORIDE 1.25 MG: 0.63 SOLUTION RESPIRATORY (INHALATION) at 06:08

## 2023-01-01 RX ADMIN — ESMOLOL HYDROCHLORIDE 150 MCG/KG/MIN: 20 INJECTION INTRAVENOUS at 03:08

## 2023-01-01 RX ADMIN — DIGOXIN 250 MCG: 250 INJECTION, SOLUTION INTRAMUSCULAR; INTRAVENOUS at 10:08

## 2023-01-01 RX ADMIN — BUDESONIDE 0.5 MG: 0.5 INHALANT ORAL at 08:08

## 2023-01-01 RX ADMIN — PANTOPRAZOLE SODIUM 40 MG: 40 INJECTION, POWDER, FOR SOLUTION INTRAVENOUS at 09:08

## 2023-01-01 RX ADMIN — ROCURONIUM BROMIDE 70 MG: 10 INJECTION INTRAVENOUS at 08:08

## 2023-01-01 RX ADMIN — PREDNISONE 40 MG: 20 TABLET ORAL at 07:08

## 2023-01-01 RX ADMIN — IPRATROPIUM BROMIDE AND ALBUTEROL SULFATE 3 ML: .5; 3 SOLUTION RESPIRATORY (INHALATION) at 07:08

## 2023-01-01 RX ADMIN — NICOTINE 1 PATCH: 14 PATCH, EXTENDED RELEASE TRANSDERMAL at 09:08

## 2023-01-01 RX ADMIN — NOREPINEPHRINE BITARTRATE 0.2 MCG/KG/MIN: 4 INJECTION, SOLUTION INTRAVENOUS at 02:08

## 2023-01-01 RX ADMIN — LEVALBUTEROL HYDROCHLORIDE 1.25 MG: 0.63 SOLUTION RESPIRATORY (INHALATION) at 02:08

## 2023-01-01 RX ADMIN — ETOMIDATE 20 MG: 2 INJECTION INTRAVENOUS at 08:08

## 2023-01-01 RX ADMIN — IPRATROPIUM BROMIDE 0.5 MG: 0.5 SOLUTION RESPIRATORY (INHALATION) at 12:08

## 2023-01-01 RX ADMIN — Medication 25 MCG/HR: at 10:08

## 2023-01-01 RX ADMIN — HYDROCODONE BITARTRATE AND ACETAMINOPHEN 1 TABLET: 5; 325 TABLET ORAL at 08:04

## 2023-01-01 RX ADMIN — PIPERACILLIN SODIUM AND TAZOBACTAM SODIUM 4.5 G: 4; .5 INJECTION, POWDER, FOR SOLUTION INTRAVENOUS at 12:08

## 2023-01-01 RX ADMIN — IPRATROPIUM BROMIDE 0.5 MG: 0.5 SOLUTION RESPIRATORY (INHALATION) at 08:08

## 2023-01-01 RX ADMIN — HEPARIN SODIUM 12 UNITS/KG/HR: 10000 INJECTION, SOLUTION INTRAVENOUS at 08:08

## 2023-01-01 RX ADMIN — Medication 5 MG/HR: at 05:08

## 2023-01-01 RX ADMIN — NOREPINEPHRINE BITARTRATE 0.12 MCG/KG/MIN: 4 INJECTION, SOLUTION INTRAVENOUS at 09:08

## 2023-01-01 RX ADMIN — Medication 5 MG/HR: at 10:08

## 2023-01-01 RX ADMIN — IPRATROPIUM BROMIDE 0.5 MG: 0.5 SOLUTION RESPIRATORY (INHALATION) at 10:08

## 2023-01-01 RX ADMIN — SODIUM CHLORIDE 500 ML: 9 INJECTION, SOLUTION INTRAVENOUS at 09:08

## 2023-01-01 RX ADMIN — INDOMETHACIN 50 MEQ: 25 CAPSULE ORAL at 01:08

## 2023-01-01 RX ADMIN — IPRATROPIUM BROMIDE 0.5 MG: 0.5 SOLUTION RESPIRATORY (INHALATION) at 02:08

## 2023-01-01 RX ADMIN — VASOPRESSIN 0.04 UNITS/MIN: 0.2 INJECTION INTRAVENOUS at 01:08

## 2023-01-01 RX ADMIN — BUDESONIDE 0.5 MG: 0.5 INHALANT ORAL at 06:08

## 2023-01-01 RX ADMIN — ESMOLOL HYDROCHLORIDE 9 MG: 100 INJECTION, SOLUTION INTRAVENOUS at 02:08

## 2023-01-01 RX ADMIN — HYDROCODONE BITARTRATE AND ACETAMINOPHEN 1 TABLET: 5; 325 TABLET ORAL at 05:08

## 2023-01-01 RX ADMIN — Medication 0.04 UNITS/MIN: at 01:08

## 2023-01-01 RX ADMIN — HEPARIN SODIUM 12 UNITS/KG/HR: 10000 INJECTION, SOLUTION INTRAVENOUS at 10:08

## 2023-01-01 RX ADMIN — MUPIROCIN: 20 OINTMENT TOPICAL at 10:08

## 2023-01-01 RX ADMIN — AMIODARONE HYDROCHLORIDE 0.5 MG/MIN: 1.8 INJECTION, SOLUTION INTRAVENOUS at 03:08

## 2023-01-01 RX ADMIN — SERTRALINE HYDROCHLORIDE 25 MG: 25 TABLET ORAL at 02:08

## 2023-01-01 RX ADMIN — ATORVASTATIN CALCIUM 20 MG: 10 TABLET, FILM COATED ORAL at 02:08

## 2023-01-01 RX ADMIN — NICOTINE 1 PATCH: 14 PATCH, EXTENDED RELEASE TRANSDERMAL at 02:08

## 2023-01-01 RX ADMIN — LEVALBUTEROL HYDROCHLORIDE 1.25 MG: 0.63 SOLUTION RESPIRATORY (INHALATION) at 08:08

## 2023-01-01 RX ADMIN — BACITRACIN ZINC, NEOMYCIN SULFATE, AND POLYMYXIN B SULFATE: 400; 3.5; 5 OINTMENT TOPICAL at 08:04

## 2023-01-01 RX ADMIN — ROCURONIUM BROMIDE 70 MG: 10 INJECTION, SOLUTION INTRAVENOUS at 08:08

## 2023-01-01 RX ADMIN — AMIODARONE HYDROCHLORIDE 150 MG: 1.5 INJECTION, SOLUTION INTRAVENOUS at 10:08

## 2023-04-30 NOTE — ED PROVIDER NOTES
History     Chief Complaint   Patient presents with    Wrist Injury     L wrist injury, pt was bringing groceries in house when she hit her hand against brick       HPI:  Yelena Briceño is a 72 y.o. female with PMH as below who presents to the [facility] emergency department for evaluation of sudden onset, sharp left wrist pain and skin tear hitting hand against brick wall. She has no other complaints.         PCP: Prakash Jo MD    Review of patient's allergies indicates:  No Known Allergies   Past Medical History:   Diagnosis Date    Coronary artery disease     GERD (gastroesophageal reflux disease)     Hyperlipidemia     Hypertension      Past Surgical History:   Procedure Laterality Date    CARDIAC CATHETERIZATION N/A      SECTION, CLASSIC      HYSTERECTOMY         Family History   Problem Relation Age of Onset    Heart disease Mother     Heart disease Father     Hypertension Father     Heart disease Paternal Grandfather     Heart disease Paternal Grandmother     Heart disease Maternal Grandmother     Heart disease Maternal Grandfather      Social History     Tobacco Use    Smoking status: Every Day     Packs/day: 0.80     Years: 50.00     Pack years: 40.00     Types: Cigarettes    Smokeless tobacco: Never   Substance and Sexual Activity    Alcohol use: Yes     Comment: beer when at camp    Drug use: No    Sexual activity: Never      Review of Systems     Review of Systems   Constitutional: Negative.    HENT: Negative.     Eyes: Negative.    Respiratory: Negative.     Cardiovascular: Negative.    Gastrointestinal: Negative.    Endocrine: Negative.    Genitourinary: Negative.    Musculoskeletal: Negative.    Skin:  Positive for wound.   Allergic/Immunologic: Negative.    Neurological: Negative.    Hematological: Negative.    Psychiatric/Behavioral: Negative.     All other systems reviewed and are negative.     Physical Exam     Initial Vitals [23]   BP Pulse Resp Temp SpO2    (!) 171/71 78 20 98.5 °F (36.9 °C) 95 %      MAP       --          Nursing notes and vital signs reviewed.  Constitutional: Patient is in mild distress.   Head: Normocephalic. Atraumatic.   Eyes:  Conjunctivae are not pale. No scleral icterus.   ENT: Mucous membranes moist.   Neck: Supple.   Cardiovascular: Regular rate. Regular rhythm.   Pulmonary: No respiratory distress.   Abdominal: Non-distended.   Musculoskeletal: Left wrist tenderness and swelling.   Skin: Warm and dry. Left wrist 1 cm L-shaped skin tear.   Neurological:  Alert, awake, and appropriate. Normal speech. No acute lateralizing neurologic deficits appreciated.   Psychiatric: Normal affect.       ED Course   Closed treatment of left distal radius and ulna fractures, without manipulation    Date/Time: 4/29/2023 9:00 PM  Performed by: Richie Monte MD  Authorized by: Richie Monte MD     Location procedure was performed:  [facility] EMERGENCY DEPARTMENT  Consent Done?:  Yes  Universal Protocol:     Verbal consent obtained?: Yes      Required items: Required blood products, implants, devices and special equipment avialable    Injury:     Injury location:  Wrist    Location details:  Left wrist    Injury type:  Fracture    Fracture type: distal radius and ulnar styloid      Fracture type: distal radius and ulnar styloid        Pre-procedure assessment:     Neurovascular status: Neurovascularly intact      Distal perfusion: normal      Neurological function: normal      Range of motion: reduced      Local anesthesia used?: No      Patient sedated?: No        Selections made in this section will also lock the Injury type section above.:     Manipulation performed?: No      Immobilization:  Splint    Splint type:  Sugar tong    Complications: No      Specimens: No      Implants: No    Post-procedure assessment:     Neurovascular status: Neurovascularly intact      Distal perfusion: normal      Neurological function: normal      Range of motion:  "splinted      Patient tolerance:  Patient tolerated the procedure well with no immediate complications  Vitals:    04/29/23 2027 04/29/23 2045   BP: (!) 171/71    Pulse: 78    Resp: 20 16   Temp: 98.5 °F (36.9 °C)    TempSrc: Oral    SpO2: 95%    Weight: 64.4 kg (141 lb 13.9 oz)    Height: 5' 4" (1.626 m)      Lab Results Interpreted as Abnormal:  Labs Reviewed - No data to display   All Lab Results:  Results for orders placed or performed during the hospital encounter of 11/07/22   CBC auto differential   Result Value Ref Range    WBC 8.11 3.90 - 12.70 K/uL    RBC 3.59 (L) 4.00 - 5.40 M/uL    Hemoglobin 11.5 (L) 12.0 - 16.0 g/dL    Hematocrit 32.5 (L) 37.0 - 48.5 %    MCV 91 82 - 98 fL    MCH 32.0 (H) 27.0 - 31.0 pg    MCHC 35.4 32.0 - 36.0 g/dL    RDW 13.7 11.5 - 14.5 %    Platelets 158 150 - 450 K/uL    MPV 9.4 9.2 - 12.9 fL    Immature Granulocytes 0.2 0.0 - 0.5 %    Gran # (ANC) 6.7 1.8 - 7.7 K/uL    Immature Grans (Abs) 0.02 0.00 - 0.04 K/uL    Lymph # 1.0 1.0 - 4.8 K/uL    Mono # 0.4 0.3 - 1.0 K/uL    Eos # 0.0 0.0 - 0.5 K/uL    Baso # 0.01 0.00 - 0.20 K/uL    nRBC 0 0 /100 WBC    Gran % 82.4 (H) 38.0 - 73.0 %    Lymph % 12.2 (L) 18.0 - 48.0 %    Mono % 4.6 4.0 - 15.0 %    Eosinophil % 0.5 0.0 - 8.0 %    Basophil % 0.1 0.0 - 1.9 %    Differential Method Automated    Brain natriuretic peptide   Result Value Ref Range     (H) 0 - 99 pg/mL   Troponin I   Result Value Ref Range    Troponin I 0.007 0.000 - 0.026 ng/mL   Comprehensive metabolic panel   Result Value Ref Range    Sodium 126 (L) 136 - 145 mmol/L    Potassium 4.3 3.5 - 5.1 mmol/L    Chloride 92 (L) 95 - 110 mmol/L    CO2 21 (L) 23 - 29 mmol/L    Glucose 125 (H) 70 - 110 mg/dL    BUN 14 8 - 23 mg/dL    Creatinine 1.2 0.5 - 1.4 mg/dL    Calcium 8.8 8.7 - 10.5 mg/dL    Total Protein 6.8 6.0 - 8.4 g/dL    Albumin 3.1 (L) 3.5 - 5.2 g/dL    Total Bilirubin 0.8 0.1 - 1.0 mg/dL    Alkaline Phosphatase 60 55 - 135 U/L    AST 27 10 - 40 U/L    ALT 7 (L) " 10 - 44 U/L    Anion Gap 13 8 - 16 mmol/L    eGFR 48.1 (A) >60 mL/min/1.73 m^2   POCT Influenza A/B Molecular   Result Value Ref Range    POC Molecular Influenza A Ag Positive (A) Negative, Not Reported    POC Molecular Influenza B Ag Negative Negative, Not Reported     Acceptable Yes    ISTAT PROCEDURE   Result Value Ref Range    POC PH 7.473 (H) 7.35 - 7.45    POC PCO2 36.0 35 - 45 mmHg    POC PO2 54 (LL) 80 - 100 mmHg    POC HCO3 26.4 24 - 28 mmol/L    POC BE 3 -2 to 2 mmol/L    POC SATURATED O2 90 (L) 95 - 100 %    Sample ARTERIAL     Site RR     Allens Test Pass     DelSys Room Air     Mode SPONT      Imaging Results              X-Ray Wrist Complete Left (Final result)  Result time 04/29/23 20:48:11      Final result by Bryant Enrique MD (04/29/23 20:48:11)                   Impression:      As above      Electronically signed by: Bryant Enrique  Date:    04/29/2023  Time:    20:48               Narrative:    EXAMINATION:  XR WRIST COMPLETE 3 VIEWS LEFT    CLINICAL HISTORY:  Unspecified fall, initial encounter    TECHNIQUE:  PA, lateral, and oblique views of the left wrist were performed.    COMPARISON:  None    FINDINGS:  Distal radial fracture deformity noted.  Ulnar styloid fracture fragment noted.  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  Decreased bone mineral density.                                     ED Physician's independent review of the above imaging: agree with radiologist, radius & ulna fractures.    The emergency physician reviewed the vital signs / test results outlined above.     ED Discussion       Patient's evaluation in the ED does not suggest any emergent or life-threatening medical conditions requiring immediate intervention beyond what was provided in the ED, and I believe patient is safe for discharge. Regardless, an unremarkable evaluation in the ED does not preclude the development or presence of a serious or life-threatening condition. As such,  patient was given return instructions for any change or worsening of symptoms.                ED Medication(s):  Medications   HYDROcodone-acetaminophen 5-325 mg per tablet 1 tablet (1 tablet Oral Given 4/29/23 2045)   neomycin-bacitracin-polymyxin ointment ( Topical (Top) Given 4/29/23 2045)     Discharge Medication List as of 4/29/2023  8:58 PM        START taking these medications    Details   HYDROcodone-acetaminophen (NORCO) 5-325 mg per tablet Take 1 tablet by mouth every 6 (six) hours as needed for Pain., Starting Sat 4/29/2023, Until Sat 5/6/2023 at 2359, Print           Prescription Management: I performed a review of the patient's current Rx medication list as input by nursing staff.     Follow-up Information       Ronaldo JOLLY Jones MD. Schedule an appointment as soon as possible for a visit in 1 week.    Specialty: Orthopedic Surgery  Contact information:  [address]  [address]  [facility] [address]  [phone]               Prakash Jo MD.    Specialty: Pathology  Why: As needed  Contact information:  [address]  [facility] [address]  [phone]               [facility] - Emergency Dept.    Specialty: Emergency Medicine  Why: As needed, If symptoms worsen  Contact information:  [address]  [facility] [address] [phone]                          Clinical Impression       ICD-10-CM ICD-9-CM   1. Closed fracture distal radius and ulna, left, initial encounter  S52.502A 813.44    S52.602A    2. Fall  W19.XXXA E888.9      ED Disposition Condition    Discharge Stable             Richie Monte MD  04/29/23 0048

## 2023-05-01 NOTE — PROGRESS NOTES
Orthopaedics Sports Medicine     Shoulder Initial Visit         2023    Referring MD: Self, Aaareferral    Chief Complaint   Patient presents with    Left Wrist - Pain, Injury         History of Present Illness:   Yelena Briceño is a 72 y.o. right-hand dominant female who presents with LEFT wrist pain and dysfunction.  Due to me.  She was seen in the ED on 2023 at which time they placed her left arm in a sugar-tong splint, provided a prescription for Norco, and told to follow up with ortho.    Onset of the symptoms was 23.     Inciting event: patient was bringing groceries inside of home when she dropped a bag and swung left arm to catch it, hitting left wrist on the corner of a brick wall.    Current symptoms include constant throbbing pain and intermittent sharp pain of the left wrist.      Pain is aggravated by applying pressure. Patient also reports left elbow and upper arm pain from splint      Evaluation to date: X-Ray     Treatment to date: Rest, activity modification, Norco, splint     Past Medical History:   Past Medical History:   Diagnosis Date    Coronary artery disease     GERD (gastroesophageal reflux disease)     Hyperlipidemia     Hypertension        Past Surgical History:   Past Surgical History:   Procedure Laterality Date    CARDIAC CATHETERIZATION N/A      SECTION, CLASSIC      HYSTERECTOMY         Medications:  Patient's Medications   New Prescriptions    No medications on file   Previous Medications    ALBUTEROL (PROVENTIL) 2.5 MG /3 ML (0.083 %) NEBULIZER SOLUTION    3 mLs.    AMLODIPINE (NORVASC) 5 MG TABLET    Take 5 mg by mouth.    ASPIRIN (ECOTRIN) 81 MG EC TABLET    Take 81 mg by mouth once daily.    ASPIRIN (ECOTRIN) 81 MG EC TABLET    aspirin    ATORVASTATIN (LIPITOR) 20 MG TABLET    Take 20 mg by mouth once daily.    CLOPIDOGREL (PLAVIX) 75 MG TABLET    Take 75 mg by mouth once daily.    DIPHENOXYLATE-ATROPINE 2.5-0.025 MG (LOMOTIL) 2.5-0.025 MG PER TABLET     diphenoxylate-atropine 2.5 mg-0.025 mg tablet    ERGOCALCIFEROL (ERGOCALCIFEROL) 50,000 UNIT CAP    TAKE ONE CAPSULE BY MOUTH EVERY 7 DAYS (ONCE WEEKLY)    FUROSEMIDE (LASIX) 40 MG TABLET    Take 40 mg by mouth every 48 hours.     GABAPENTIN (NEURONTIN) 300 MG CAPSULE    Take 1 capsule (300 mg total) by mouth 3 (three) times daily. for 14 days    HYDROCODONE-ACETAMINOPHEN (NORCO) 5-325 MG PER TABLET    Take 1 tablet by mouth every 6 (six) hours as needed for Pain.    ISOSORBIDE MONONITRATE (IMDUR) 30 MG 24 HR TABLET    Take 30 mg by mouth once daily.    LISINOPRIL 10 MG TABLET    Take 10 mg by mouth once daily.    MULTIVIT-MIN/FERROUS FUMARATE (MULTI VITAMIN ORAL)    Take by mouth.    NITROGLYCERIN (NITROSTAT) 0.4 MG SL TABLET    nitroglycerin 0.4 mg sublingual tablet    OSELTAMIVIR (TAMIFLU) 75 MG CAPSULE    Tamiflu 75 mg capsule   Take 1 capsule every day by oral route.    PANTOPRAZOLE (PROTONIX) 40 MG TABLET    Take 1 tablet (40 mg total) by mouth once daily.    ROPINIROLE (REQUIP) 0.5 MG TABLET    ropinirole 0.5 mg tablet   Take 1 tablet every day by oral route at bedtime.    SERTRALINE (ZOLOFT) 25 MG TABLET    sertraline 25 mg tablet   Take 1 tablet every day by oral route.    TRAZODONE (DESYREL) 50 MG TABLET    Take 50 mg by mouth.   Modified Medications    No medications on file   Discontinued Medications    No medications on file       Allergies:   Review of patient's allergies indicates:   Allergen Reactions    Shellfish containing products        Social History:   Home town: [place], [place]  Occupation: retired  Alcohol use: She reports current alcohol use.  Tobacco use: She reports that she has been smoking cigarettes. She has a 40.00 pack-year smoking history. She has never used smokeless tobacco.    Review of systems:  History of recent illness, fevers, shakes, or chills: no  History of cardiac problems or chest pain: yes (Hx heart disease)  History of pulmonary problems or asthma: yes (Hx COPD)  History  "of diabetes: no  History of prior dvt or clotting problems: no  History of sleep apnea: no      Physical Examination:  Estimated body mass index is 24.2 kg/m² as calculated from the following:    Height as of this encounter: 5' 4" (1.626 m).    Weight as of this encounter: 64 kg (141 lb).    General  Healthy appearing female in no acute distress  Alert and oriented, normal mood, appropriate affect    Left Wrist:  Inspection: moderate swelling, 1x2 cm abrasion on dorsal aspect of forearm  Palpation: ttp along forearm and ulnar stylus   Range of motion: limited due to pain   N/V Exam:  Radial: Normal motor (EPL/thumbs up)               Normal sensory (dorsal hand)   Median: Normal motor (FPL/A-OK)      Normal sensory (thumb)   Ulnar:  Normal motor (Interossei/scissors-spread)     Normal sensory (5th finger)   LABC: Normal sensory (lateral forearm)   MABC: Normal sensory (medial forearm)   MC: Normal motor (elbow flexion)   Axillary: Normal motor/sensory (deltoid)  Normal radial and ulnar pulses, warm and well perfused with capillary refill < 2 sec      Imaging:  XR Results:  EXAMINATION:  XR WRIST COMPLETE 3 VIEWS LEFT     CLINICAL HISTORY:  Unspecified fall, initial encounter     TECHNIQUE:  PA, lateral, and oblique views of the left wrist were performed.     COMPARISON:  None     FINDINGS:  Distal radial fracture deformity noted.  Ulnar styloid fracture fragment noted.  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  Decreased bone mineral density.     Impression:     As above        Electronically signed by: Bryant Enrique  Date:                                            04/29/2023  Time:                                           20:48      Physician Read: I agree with the above impression.      Impression:  72 y.o. female with closed fracture of left distal radius and ulna      Plan:  Discussed diagnosis and treatment options with patient today.  She has a closed fracture of her left distal " radius and ulna  Reviewed x-rays with the patient today, she has a closed fracture of the left distal radius and ulna with minimal dorsal angulation. Based on this minimal fracture displacement, I recommend we continue with non-operative management  She presented today in a sugar-tong splint that was placed in the ED. we removed the splint, cleaned and irrigated the wound that was present on the dorsal aspect of the forearm. I placed a nonstick dressing over the wound and reapply the splint with more padding.  I encouraged her to continue to elevate the arm above the level of her heart to aid in reducing swelling.  She was prescribed Norco in the ED, continue taking these as needed for the pain  Follow up in 1 week with Dr. Gavin with repeat x-ray      Jazmine Dawkins PA-C  Sports Medicine Physician Assistant       Disclaimer: This note was prepared using a voice recognition system and is likely to have sound alike errors within the text.

## 2023-05-09 NOTE — PROGRESS NOTES
Subjective:     Patient ID: Yelena Briceño is a 72 y.o. female.    Chief Complaint: Pain and Injury of the Left Wrist      HPI:  The patient is a 72-year-old female who stumbled 2023 and had her left arm hit a brick corner sustaining a fracture distal radius.  She was placed in a sugar-tong splint.    Past Medical History:   Diagnosis Date    Coronary artery disease     GERD (gastroesophageal reflux disease)     Hyperlipidemia     Hypertension      Past Surgical History:   Procedure Laterality Date    CARDIAC CATHETERIZATION N/A      SECTION, CLASSIC      HYSTERECTOMY       Family History   Problem Relation Age of Onset    Heart disease Mother     Heart disease Father     Hypertension Father     Heart disease Paternal Grandfather     Heart disease Paternal Grandmother     Heart disease Maternal Grandmother     Heart disease Maternal Grandfather      Social History     Socioeconomic History    Marital status: Single   Tobacco Use    Smoking status: Every Day     Packs/day: 0.80     Years: 50.00     Pack years: 40.00     Types: Cigarettes    Smokeless tobacco: Never   Substance and Sexual Activity    Alcohol use: Yes     Comment: beer when at camp    Drug use: No    Sexual activity: Never     Medication List with Changes/Refills   Current Medications    ALBUTEROL (PROVENTIL) 2.5 MG /3 ML (0.083 %) NEBULIZER SOLUTION    3 mLs.    AMLODIPINE (NORVASC) 5 MG TABLET    Take 5 mg by mouth.    ASPIRIN (ECOTRIN) 81 MG EC TABLET    Take 81 mg by mouth once daily.    ASPIRIN (ECOTRIN) 81 MG EC TABLET    aspirin    ATORVASTATIN (LIPITOR) 20 MG TABLET    Take 20 mg by mouth once daily.    CLOPIDOGREL (PLAVIX) 75 MG TABLET    Take 75 mg by mouth once daily.    DIPHENOXYLATE-ATROPINE 2.5-0.025 MG (LOMOTIL) 2.5-0.025 MG PER TABLET    diphenoxylate-atropine 2.5 mg-0.025 mg tablet    ERGOCALCIFEROL (ERGOCALCIFEROL) 50,000 UNIT CAP    TAKE ONE CAPSULE BY MOUTH EVERY 7 DAYS (ONCE WEEKLY)    FUROSEMIDE (LASIX) 40 MG TABLET     Take 40 mg by mouth every 48 hours.     GABAPENTIN (NEURONTIN) 300 MG CAPSULE    Take 1 capsule (300 mg total) by mouth 3 (three) times daily. for 14 days    ISOSORBIDE MONONITRATE (IMDUR) 30 MG 24 HR TABLET    Take 30 mg by mouth once daily.    LISINOPRIL 10 MG TABLET    Take 10 mg by mouth once daily.    MULTIVIT-MIN/FERROUS FUMARATE (MULTI VITAMIN ORAL)    Take by mouth.    NITROGLYCERIN (NITROSTAT) 0.4 MG SL TABLET    nitroglycerin 0.4 mg sublingual tablet    OSELTAMIVIR (TAMIFLU) 75 MG CAPSULE    Tamiflu 75 mg capsule   Take 1 capsule every day by oral route.    PANTOPRAZOLE (PROTONIX) 40 MG TABLET    Take 1 tablet (40 mg total) by mouth once daily.    ROPINIROLE (REQUIP) 0.5 MG TABLET    ropinirole 0.5 mg tablet   Take 1 tablet every day by oral route at bedtime.    SERTRALINE (ZOLOFT) 25 MG TABLET    sertraline 25 mg tablet   Take 1 tablet every day by oral route.    TRAZODONE (DESYREL) 50 MG TABLET    Take 50 mg by mouth.     Review of patient's allergies indicates:   Allergen Reactions    Shellfish containing products      Review of Systems   Constitutional: Negative for malaise/fatigue.   HENT:  Negative for hearing loss.    Eyes:  Negative for double vision and visual disturbance.   Cardiovascular:  Negative for chest pain.   Respiratory:  Positive for shortness of breath.    Endocrine: Negative for cold intolerance.   Hematologic/Lymphatic: Does not bruise/bleed easily.   Skin:  Negative for poor wound healing and suspicious lesions.   Musculoskeletal:  Negative for gout, joint pain and joint swelling.   Gastrointestinal:  Positive for heartburn. Negative for nausea and vomiting.   Genitourinary:  Negative for dysuria.   Neurological:  Negative for numbness, paresthesias and sensory change.   Psychiatric/Behavioral:  Positive for substance abuse. Negative for depression and memory loss. The patient is not nervous/anxious.    Allergic/Immunologic: Negative for persistent infections.     Objective:    Body mass index is 24.2 kg/m².  There were no vitals filed for this visit.             General    Constitutional: She is oriented to person, place, and time. She appears well-developed and well-nourished. No distress.   HENT:   Head: Normocephalic.   Eyes: EOM are normal.   Pulmonary/Chest: Effort normal.   Neurological: She is oriented to person, place, and time.   Psychiatric: She has a normal mood and affect.         Left Hand/Wrist Exam     Inspection   Scars: Wrist - present Hand -  absent  Effusion: Wrist - absent Hand -  absent    Pain   Wrist - The patient exhibits pain of the scapholunate/lunate ECU.    Other     Sensory Exam  Median Distribution: normal  Ulnar Distribution: normal  Radial Distribution: normal    Comments:  The patient had a small dorsal distal forearm abrasion that is benign and looks almost healed.  There is no drainage.  There is tenderness about the fracture site and swelling particularly about the ulnar styloid.          Vascular Exam       Capillary Refill  Left Hand: normal capillary refill        Relevant imaging results reviewed and interpreted by me, discussed with the patient and / or family today radiographs left wrist showed a transverse fracture extra-articular distal radius with an ulnar styloid fracture  Assessment:     Encounter Diagnosis   Name Primary?    Closed fracture distal radius and ulna, left, initial encounter Yes        Plan:     The patient has an acceptable position for her fracture.  She was placed in an Exos splint.  She will return in 4 weeks for re-x-ray.  I told her it takes 6-8 weeks for the bone to heal and she probably has residual symptoms about the ulnar styloid and triangular fibrocartilage complex.                Disclaimer: This note was prepared using a voice recognition system and is likely to have sound alike errors within the text.

## 2023-06-06 NOTE — PROGRESS NOTES
Subjective:     Patient ID: Yelena Brcieño is a 72 y.o. female.    Chief Complaint: Pain and Injury of the Left Wrist      HPI:  The patient is a 72-year-old female with a left distal radius and ulnar styloid fracture date of injury is 2023.  She has been treated with an Exos wrist splint.  She seems to be doing well.    Past Medical History:   Diagnosis Date    Coronary artery disease     GERD (gastroesophageal reflux disease)     Hyperlipidemia     Hypertension      Past Surgical History:   Procedure Laterality Date    CARDIAC CATHETERIZATION N/A      SECTION, CLASSIC      HYSTERECTOMY       Family History   Problem Relation Age of Onset    Heart disease Mother     Heart disease Father     Hypertension Father     Heart disease Paternal Grandfather     Heart disease Paternal Grandmother     Heart disease Maternal Grandmother     Heart disease Maternal Grandfather      Social History     Socioeconomic History    Marital status: Single   Tobacco Use    Smoking status: Every Day     Packs/day: 0.80     Years: 50.00     Pack years: 40.00     Types: Cigarettes    Smokeless tobacco: Never   Substance and Sexual Activity    Alcohol use: Yes     Comment: beer when at camp    Drug use: No    Sexual activity: Never     Medication List with Changes/Refills   Current Medications    ALBUTEROL (PROVENTIL) 2.5 MG /3 ML (0.083 %) NEBULIZER SOLUTION    3 mLs.    AMLODIPINE (NORVASC) 5 MG TABLET    Take 5 mg by mouth.    ASPIRIN (ECOTRIN) 81 MG EC TABLET    Take 81 mg by mouth once daily.    ASPIRIN (ECOTRIN) 81 MG EC TABLET    aspirin    ATORVASTATIN (LIPITOR) 20 MG TABLET    Take 20 mg by mouth once daily.    CLOPIDOGREL (PLAVIX) 75 MG TABLET    Take 75 mg by mouth once daily.    DIPHENOXYLATE-ATROPINE 2.5-0.025 MG (LOMOTIL) 2.5-0.025 MG PER TABLET    diphenoxylate-atropine 2.5 mg-0.025 mg tablet    ERGOCALCIFEROL (ERGOCALCIFEROL) 50,000 UNIT CAP    TAKE ONE CAPSULE BY MOUTH EVERY 7 DAYS (ONCE WEEKLY)    FUROSEMIDE  (LASIX) 40 MG TABLET    Take 40 mg by mouth every 48 hours.     GABAPENTIN (NEURONTIN) 300 MG CAPSULE    Take 1 capsule (300 mg total) by mouth 3 (three) times daily. for 14 days    ISOSORBIDE MONONITRATE (IMDUR) 30 MG 24 HR TABLET    Take 30 mg by mouth once daily.    LISINOPRIL 10 MG TABLET    Take 10 mg by mouth once daily.    MULTIVIT-MIN/FERROUS FUMARATE (MULTI VITAMIN ORAL)    Take by mouth.    NITROGLYCERIN (NITROSTAT) 0.4 MG SL TABLET    nitroglycerin 0.4 mg sublingual tablet    OSELTAMIVIR (TAMIFLU) 75 MG CAPSULE    Tamiflu 75 mg capsule   Take 1 capsule every day by oral route.    PANTOPRAZOLE (PROTONIX) 40 MG TABLET    Take 1 tablet (40 mg total) by mouth once daily.    ROPINIROLE (REQUIP) 0.5 MG TABLET    ropinirole 0.5 mg tablet   Take 1 tablet every day by oral route at bedtime.    SERTRALINE (ZOLOFT) 25 MG TABLET    sertraline 25 mg tablet   Take 1 tablet every day by oral route.    TRAZODONE (DESYREL) 50 MG TABLET    Take 50 mg by mouth.     Review of patient's allergies indicates:   Allergen Reactions    Shellfish containing products      Review of Systems   Constitutional: Negative for malaise/fatigue.   HENT:  Negative for hearing loss.    Eyes:  Negative for double vision and visual disturbance.   Cardiovascular:  Positive for chest pain.   Respiratory:  Positive for shortness of breath.    Endocrine: Negative for cold intolerance.   Hematologic/Lymphatic: Does not bruise/bleed easily.   Skin:  Negative for poor wound healing and suspicious lesions.   Musculoskeletal:  Negative for gout, joint pain and joint swelling.   Gastrointestinal:  Positive for heartburn. Negative for nausea and vomiting.   Genitourinary:  Negative for dysuria.   Neurological:  Negative for numbness, paresthesias and sensory change.   Psychiatric/Behavioral:  Positive for substance abuse. Negative for depression and memory loss. The patient is not nervous/anxious.    Allergic/Immunologic: Negative for persistent  infections.     Objective:   Body mass index is 24.2 kg/m².  There were no vitals filed for this visit.             General    Constitutional: She is oriented to person, place, and time. She appears well-developed and well-nourished. No distress.   HENT:   Head: Normocephalic.   Eyes: EOM are normal.   Pulmonary/Chest: Effort normal.   Neurological: She is oriented to person, place, and time.   Psychiatric: She has a normal mood and affect.         Left Hand/Wrist Exam     Inspection   Scars: Wrist - absent Hand -  absent  Effusion: Wrist - present Hand -  absent    Other     Sensory Exam  Median Distribution: normal  Ulnar Distribution: normal  Radial Distribution: normal    Comments:  The patient has no tenderness about the fracture site.  There is no deformity.  There are no motor or sensory deficits.          Vascular Exam       Capillary Refill  Left Hand: normal capillary refill        Relevant imaging results reviewed and interpreted by me, discussed with the patient and / or family today radiographs left wrist showed healed fracture distal radius with some settling in about 2 mm ulnar variance positive with ulnar styloid fracture  Assessment:     Encounter Diagnosis   Name Primary?    Closed fracture of distal ends of left radius and ulna with routine healing, subsequent encounter Yes        Plan:       The patient was given a short thumb spica splint.  Activity limitations were discussed.  She seems to be doing well will return on a as needed basis.              Disclaimer: This note was prepared using a voice recognition system and is likely to have sound alike errors within the text.

## 2023-08-25 NOTE — Clinical Note
Diagnosis: Atrial fibrillation with rapid ventricular response [964823]   Future Attending Provider: IRAIS RICE [[id number]]   Is the patient being admitted to ED TeleObservation?: No   Is the Patient being Admitted to ED Teleobservation? If the answer is No: Patient acuity level too high   Admitting Provider:: IRAIS RICE [[id number]]

## 2023-08-26 PROBLEM — J44.1 COPD EXACERBATION: Status: ACTIVE | Noted: 2023-01-01

## 2023-08-26 PROBLEM — R79.89 ELEVATED TROPONIN: Status: ACTIVE | Noted: 2023-01-01

## 2023-08-26 PROBLEM — I48.91 NEW ONSET ATRIAL FIBRILLATION: Status: ACTIVE | Noted: 2023-01-01

## 2023-08-26 PROBLEM — J96.01 ACUTE RESPIRATORY FAILURE WITH HYPOXEMIA: Status: ACTIVE | Noted: 2023-01-01

## 2023-08-26 NOTE — ASSESSMENT & PLAN NOTE
Patient admits that she started smoking at the age of 14 and currently smokes approximately half pack of cigarettes daily.  States that she has tried to quit smoking before however has been unsuccessful    Dangers of cigarette smoking were reviewed with patient in detail. Patient was Counseled for 3-10 minutes. Nicotine replacement options were discussed. Nicotine replacement was discussed- prescribed

## 2023-08-26 NOTE — ASSESSMENT & PLAN NOTE
Patient with Paroxysmal (<7 days) atrial fibrillation which is controlled currently with Calcium Channel Blocker. Patient is currently in atrial fibrillation.[id number] Score: 3. HASBLED Score: . Anticoagulation indicated. Anticoagulation done with Heparin infusion.    New onset AFib RVR noted when patient was in [facility] ED. temporarily converted to sinus rhythm after bolus of Cardizem, however went back to AFib RVR shortly after and started on Cardizem and heparin infusion  EKG obtained after arrival at [facility] showed undetermined rhythm  Cardiology consulted   Echo pending  Continue cardiac monitoring

## 2023-08-26 NOTE — ASSESSMENT & PLAN NOTE
Follows with Dr. Nair outpatient for Cardiology  Continue home aspirin, Plavix  Cardiology consulted

## 2023-08-26 NOTE — SUBJECTIVE & OBJECTIVE
Past Medical History:   Diagnosis Date    Coronary artery disease     GERD (gastroesophageal reflux disease)     Hyperlipidemia     Hypertension        Past Surgical History:   Procedure Laterality Date    CARDIAC CATHETERIZATION N/A      SECTION, CLASSIC      HYSTERECTOMY         Review of patient's allergies indicates:   Allergen Reactions    Shellfish containing products        No current facility-administered medications on file prior to encounter.     Current Outpatient Medications on File Prior to Encounter   Medication Sig    amLODIPine (NORVASC) 5 MG tablet Take 5 mg by mouth.    aspirin (ECOTRIN) 81 MG EC tablet aspirin    atorvastatin (LIPITOR) 20 MG tablet Take 20 mg by mouth once daily.    ergocalciferol (ERGOCALCIFEROL) 50,000 unit Cap TAKE ONE CAPSULE BY MOUTH EVERY 7 DAYS (ONCE WEEKLY)    furosemide (LASIX) 40 MG tablet Take 40 mg by mouth every 48 hours.     isosorbide mononitrate (IMDUR) 30 MG 24 hr tablet Take 30 mg by mouth once daily.    lisinopril 10 MG tablet Take 10 mg by mouth once daily.    multivit-min/ferrous fumarate (MULTI VITAMIN ORAL) Take by mouth.    pantoprazole (PROTONIX) 40 MG tablet Take 1 tablet (40 mg total) by mouth once daily.    rOPINIRole (REQUIP) 0.5 MG tablet ropinirole 0.5 mg tablet   Take 1 tablet every day by oral route at bedtime.    sertraline (ZOLOFT) 25 MG tablet sertraline 25 mg tablet   Take 1 tablet every day by oral route.    albuterol (PROVENTIL) 2.5 mg /3 mL (0.083 %) nebulizer solution 3 mLs.    aspirin (ECOTRIN) 81 MG EC tablet Take 81 mg by mouth once daily.    clopidogrel (PLAVIX) 75 mg tablet Take 75 mg by mouth once daily.    diphenoxylate-atropine 2.5-0.025 mg (LOMOTIL) 2.5-0.025 mg per tablet diphenoxylate-atropine 2.5 mg-0.025 mg tablet    gabapentin (NEURONTIN) 300 MG capsule Take 1 capsule (300 mg total) by mouth 3 (three) times daily. for 14 days (Patient taking differently: Take 100 mg by mouth 3 (three) times daily.)    nitroGLYCERIN  (NITROSTAT) 0.4 MG SL tablet nitroglycerin 0.4 mg sublingual tablet    oseltamivir (TAMIFLU) 75 MG capsule Tamiflu 75 mg capsule   Take 1 capsule every day by oral route.    traZODone (DESYREL) 50 MG tablet Take 50 mg by mouth.     Family History       Problem Relation (Age of Onset)    Heart disease Mother, Father, Paternal Grandfather, Paternal Grandmother, Maternal Grandmother, Maternal Grandfather    Hypertension Father          Tobacco Use    Smoking status: Every Day     Current packs/day: 0.80     Average packs/day: 0.8 packs/day for 50.0 years (40.0 ttl pk-yrs)     Types: Cigarettes    Smokeless tobacco: Never   Substance and Sexual Activity    Alcohol use: Yes     Comment: beer when at camp    Drug use: No    Sexual activity: Not Currently     Review of Systems   Constitutional:  Positive for activity change and chills. Negative for fever.   Respiratory:  Positive for cough (Productive) and shortness of breath.    Cardiovascular:  Negative for chest pain and palpitations.   Gastrointestinal:  Negative for abdominal pain, constipation, diarrhea, nausea and vomiting.   Neurological:  Positive for tremors, weakness and headaches. Negative for dizziness.   All other systems reviewed and are negative.    Objective:     Vital Signs (Most Recent):  Temp: 98.5 °F (36.9 °C) (08/26/23 1153)  Pulse: 87 (08/26/23 1153)  Resp: 18 (08/26/23 1153)  BP: 107/61 (08/26/23 1153)  SpO2: (!) 90 % (08/26/23 1153) Vital Signs (24h Range):  Temp:  [98.1 °F (36.7 °C)-98.5 °F (36.9 °C)] 98.5 °F (36.9 °C)  Pulse:  [] 87  Resp:  [18-28] 18  SpO2:  [89 %-95 %] 90 %  BP: ()/(53-76) 107/61     Weight: 62.1 kg (136 lb 14.5 oz)  Body mass index is 23.5 kg/m².     Physical Exam  Vitals and nursing note reviewed. Exam conducted with a chaperone present.   Constitutional:       General: She is not in acute distress.     Appearance: She is ill-appearing. She is not toxic-appearing or diaphoretic.   HENT:      Head: Normocephalic  and atraumatic.      Right Ear: External ear normal.      Left Ear: External ear normal.      Nose: Nose normal.      Mouth/Throat:      Mouth: Mucous membranes are moist.   Eyes:      Pupils: Pupils are equal, round, and reactive to light.   Cardiovascular:      Rate and Rhythm: Normal rate and regular rhythm.   Pulmonary:      Effort: Pulmonary effort is normal. No accessory muscle usage or respiratory distress.      Breath sounds: Wheezing present. No rhonchi or rales.      Comments: On 2 L O2  Abdominal:      General: Bowel sounds are normal. There is no distension.      Palpations: Abdomen is soft.      Tenderness: There is no abdominal tenderness. There is no guarding or rebound.   Musculoskeletal:      Cervical back: Neck supple.      Right lower leg: No edema.      Left lower leg: No edema.   Skin:     General: Skin is warm and dry.   Neurological:      Mental Status: She is alert and oriented to person, place, and time.      Motor: Weakness present.   Psychiatric:         Mood and Affect: Affect is flat.         Speech: Speech normal.         Behavior: Behavior is cooperative.              CRANIAL NERVES     CN III, IV, VI   Pupils are equal, round, and reactive to light.       Significant Labs: All pertinent labs within the past 24 hours have been reviewed.  CBC:   Recent Labs   Lab 08/25/23 1932 08/26/23  0719   WBC 9.64 7.96   HGB 12.2 12.5   HCT 35.7* 35.7*    133*     CMP:   Recent Labs   Lab 08/25/23 1932 08/26/23  1159   * 127*   K 3.4* 4.0   CL 94* 93*   CO2 20* 21*   * 131*   BUN 18 26*   CREATININE 1.1 1.1   CALCIUM 8.7 9.0   PROT 7.0 7.0   ALBUMIN 3.1* 3.0*   BILITOT 1.3* 1.3*   ALKPHOS 53* 42*   AST 38 54*   ALT 10 12   ANIONGAP 14 13       Significant Imaging: I have reviewed all pertinent imaging results/findings within the past 24 hours.

## 2023-08-26 NOTE — ASSESSMENT & PLAN NOTE
Currently normotensive   As patient is on Cardizem infusion at this time we will hold off on resuming home antihypertensive meds  Monitor BP

## 2023-08-26 NOTE — ASSESSMENT & PLAN NOTE
Patient with Hypoxic Respiratory failure which is Acute.  she is not on home oxygen. Supplemental oxygen was provided and noted-      .   Signs/symptoms of respiratory failure include- increased work of breathing and wheezing. Contributing diagnoses includes - COPD Labs and images were reviewed. Patient Has recent ABG, which has been reviewed. Will treat underlying causes and adjust management of respiratory failure as follows-     Treat COPD exacerbation with steroid, nebs  Wean O2 as able.  Titrate to maintain SpO2 > 90%

## 2023-08-26 NOTE — H&P
[facility] Medicine  History & Physical    Patient Name: Yelena Briceño  MRN: [id number]  Patient Class: OP- Observation  Admission Date: 8/25/2023  Attending Physician: Nelly Warner DO   Primary Care Provider: Prakash Jo MD         Patient information was obtained from patient, past medical records, ER records and daughter.     Subjective:     Principal Problem:Atrial fibrillation with RVR    Chief Complaint:   Chief Complaint   Patient presents with    Shortness of Breath     Hx of COPD. Started today.    Weakness    Abdominal Pain     Right sided        HPI: Yelena Briceño is a 72 y.o. female with CAD, GERD, Hyperlipidemia, COPD and Hypertension who initially presented to [facility] ED on 8/25/2023 with complaints of progressive shortness of breath and weakness.  Family reports that for several days prior to ED presentation patient had been getting noticeably weaker having difficulty ambulating.  She was apparently complaining of feeling cold and unable to get warm.  Patient also reports increasing cough with greenish sputum and worsening shortness of breath.  At the time, she denied any chest pain, palpitation.  On arrival in ED, patient's heart rate was initially normal. However shortly after she was noted to have tachycardia and EKG showed new onset atrial fibrillation with RVR.  She was given a dose of Cardizem with improvement of her heart rate and converted back to sinus rhythm although her blood pressure apparently dropped transiently to 89/60.  BP normalized, however she went back into AFib RVR.  Subsequently initiated on Cardizem infusion.  Patient admitted to an episode of substernal chest pain while in ED described as pressure which has since resolved.  Additionally patient was given neb treatment x3 as well as prednisone 40 mg in the ED. Patient transferred to [facility] for further management of new onset AFib RVR and COPD exacerbation.      PCP: Prakash Jo  MD ERICA    SDM:  DaughterSameera    CODE STATUS:  Full code, discussed with patient and daughter        Past Medical History:   Diagnosis Date    Coronary artery disease     GERD (gastroesophageal reflux disease)     Hyperlipidemia     Hypertension        Past Surgical History:   Procedure Laterality Date    CARDIAC CATHETERIZATION N/A      SECTION, CLASSIC      HYSTERECTOMY         Review of patient's allergies indicates:   Allergen Reactions    Shellfish containing products        No current facility-administered medications on file prior to encounter.     Current Outpatient Medications on File Prior to Encounter   Medication Sig    amLODIPine (NORVASC) 5 MG tablet Take 5 mg by mouth.    aspirin (ECOTRIN) 81 MG EC tablet aspirin    atorvastatin (LIPITOR) 20 MG tablet Take 20 mg by mouth once daily.    ergocalciferol (ERGOCALCIFEROL) 50,000 unit Cap TAKE ONE CAPSULE BY MOUTH EVERY 7 DAYS (ONCE WEEKLY)    furosemide (LASIX) 40 MG tablet Take 40 mg by mouth every 48 hours.     isosorbide mononitrate (IMDUR) 30 MG 24 hr tablet Take 30 mg by mouth once daily.    lisinopril 10 MG tablet Take 10 mg by mouth once daily.    multivit-min/ferrous fumarate (MULTI VITAMIN ORAL) Take by mouth.    pantoprazole (PROTONIX) 40 MG tablet Take 1 tablet (40 mg total) by mouth once daily.    rOPINIRole (REQUIP) 0.5 MG tablet ropinirole 0.5 mg tablet   Take 1 tablet every day by oral route at bedtime.    sertraline (ZOLOFT) 25 MG tablet sertraline 25 mg tablet   Take 1 tablet every day by oral route.    albuterol (PROVENTIL) 2.5 mg /3 mL (0.083 %) nebulizer solution 3 mLs.    aspirin (ECOTRIN) 81 MG EC tablet Take 81 mg by mouth once daily.    clopidogrel (PLAVIX) 75 mg tablet Take 75 mg by mouth once daily.    diphenoxylate-atropine 2.5-0.025 mg (LOMOTIL) 2.5-0.025 mg per tablet diphenoxylate-atropine 2.5 mg-0.025 mg tablet    gabapentin (NEURONTIN) 300 MG capsule Take 1 capsule (300 mg total) by  mouth 3 (three) times daily. for 14 days (Patient taking differently: Take 100 mg by mouth 3 (three) times daily.)    nitroGLYCERIN (NITROSTAT) 0.4 MG SL tablet nitroglycerin 0.4 mg sublingual tablet    oseltamivir (TAMIFLU) 75 MG capsule Tamiflu 75 mg capsule   Take 1 capsule every day by oral route.    traZODone (DESYREL) 50 MG tablet Take 50 mg by mouth.     Family History       Problem Relation (Age of Onset)    Heart disease Mother, Father, Paternal Grandfather, Paternal Grandmother, Maternal Grandmother, Maternal Grandfather    Hypertension Father          Tobacco Use    Smoking status: Every Day     Current packs/day: 0.80     Average packs/day: 0.8 packs/day for 50.0 years (40.0 ttl pk-yrs)     Types: Cigarettes    Smokeless tobacco: Never   Substance and Sexual Activity    Alcohol use: Yes     Comment: beer when at camp    Drug use: No    Sexual activity: Not Currently     Review of Systems   Constitutional:  Positive for activity change and chills. Negative for fever.   Respiratory:  Positive for cough (Productive) and shortness of breath.    Cardiovascular:  Negative for chest pain and palpitations.   Gastrointestinal:  Negative for abdominal pain, constipation, diarrhea, nausea and vomiting.   Neurological:  Positive for tremors, weakness and headaches. Negative for dizziness.   All other systems reviewed and are negative.    Objective:     Vital Signs (Most Recent):  Temp: 98.5 °F (36.9 °C) (08/26/23 1153)  Pulse: 87 (08/26/23 1153)  Resp: 18 (08/26/23 1153)  BP: 107/61 (08/26/23 1153)  SpO2: (!) 90 % (08/26/23 1153) Vital Signs (24h Range):  Temp:  [98.1 °F (36.7 °C)-98.5 °F (36.9 °C)] 98.5 °F (36.9 °C)  Pulse:  [] 87  Resp:  [18-28] 18  SpO2:  [89 %-95 %] 90 %  BP: ()/(53-76) 107/61     Weight: 62.1 kg (136 lb 14.5 oz)  Body mass index is 23.5 kg/m².     Physical Exam  Vitals and nursing note reviewed. Exam conducted with a chaperone present.   Constitutional:       General: She is  not in acute distress.     Appearance: She is ill-appearing. She is not toxic-appearing or diaphoretic.   HENT:      Head: Normocephalic and atraumatic.      Right Ear: External ear normal.      Left Ear: External ear normal.      Nose: Nose normal.      Mouth/Throat:      Mouth: Mucous membranes are moist.   Eyes:      Pupils: Pupils are equal, round, and reactive to light.   Cardiovascular:      Rate and Rhythm: Normal rate and regular rhythm.   Pulmonary:      Effort: Pulmonary effort is normal. No accessory muscle usage or respiratory distress.      Breath sounds: Wheezing present. No rhonchi or rales.      Comments: On 2 L O2  Abdominal:      General: Bowel sounds are normal. There is no distension.      Palpations: Abdomen is soft.      Tenderness: There is no abdominal tenderness. There is no guarding or rebound.   Musculoskeletal:      Cervical back: Neck supple.      Right lower leg: No edema.      Left lower leg: No edema.   Skin:     General: Skin is warm and dry.   Neurological:      Mental Status: She is alert and oriented to person, place, and time.      Motor: Weakness present.   Psychiatric:         Mood and Affect: Affect is flat.         Speech: Speech normal.         Behavior: Behavior is cooperative.              CRANIAL NERVES     CN III, IV, VI   Pupils are equal, round, and reactive to light.       Significant Labs: All pertinent labs within the past 24 hours have been reviewed.  CBC:   Recent Labs   Lab 08/25/23 1932 08/26/23  0719   WBC 9.64 7.96   HGB 12.2 12.5   HCT 35.7* 35.7*    133*     CMP:   Recent Labs   Lab 08/25/23 1932 08/26/23  1159   * 127*   K 3.4* 4.0   CL 94* 93*   CO2 20* 21*   * 131*   BUN 18 26*   CREATININE 1.1 1.1   CALCIUM 8.7 9.0   PROT 7.0 7.0   ALBUMIN 3.1* 3.0*   BILITOT 1.3* 1.3*   ALKPHOS 53* 42*   AST 38 54*   ALT 10 12   ANIONGAP 14 13       Significant Imaging: I have reviewed all pertinent imaging results/findings within the past 24  hours.    Assessment/Plan:     * Atrial fibrillation with RVR  Patient with Paroxysmal (<7 days) atrial fibrillation which is controlled currently with Calcium Channel Blocker. Patient is currently in atrial fibrillation.[id number] Score: 3. HASBLED Score: . Anticoagulation indicated. Anticoagulation done with Heparin infusion.    New onset AFib RVR noted when patient was in [facility] ED. temporarily converted to sinus rhythm after bolus of Cardizem, however went back to AFib RVR shortly after and started on Cardizem and heparin infusion  EKG obtained after arrival at [facility] showed undetermined rhythm  Cardiology consulted   Echo pending  Continue cardiac monitoring    Elevated troponin  Patient had 1 episode of chest pain in ED which has since resolved without any nitro or pain medication  Possibly demand ischemia in setting of AFib RVR and COPD exacerbation  Trend troponin  Cardiology consulted        Acute respiratory failure with hypoxemia  Patient with Hypoxic Respiratory failure which is Acute.  she is not on home oxygen. Supplemental oxygen was provided and noted-      .   Signs/symptoms of respiratory failure include- increased work of breathing and wheezing. Contributing diagnoses includes - COPD Labs and images were reviewed. Patient Has recent ABG, which has been reviewed. Will treat underlying causes and adjust management of respiratory failure as follows-     Treat COPD exacerbation with steroid, nebs  Wean O2 as able.  Titrate to maintain SpO2 > 90%    COPD exacerbation  IV Solu-Medrol  Scheduled nebs  Supplemental O2  Monitor respiratory status      Nicotine dependence, cigarettes, uncomplicated  Patient admits that she started smoking at the age of 14 and currently smokes approximately half pack of cigarettes daily.  States that she has tried to quit smoking before however has been unsuccessful    Dangers of cigarette smoking were reviewed with patient in detail. Patient was Counseled for 3-10  minutes. Nicotine replacement options were discussed. Nicotine replacement was discussed- prescribed    Coronary atherosclerosis of unspecified type of vessel, native or graft  Follows with Dr. Nair outpatient for Cardiology  Continue home aspirin, Plavix  Cardiology consulted      Essential hypertension, benign  Currently normotensive   As patient is on Cardizem infusion at this time we will hold off on resuming home antihypertensive meds  Monitor BP        VTE Risk Mitigation (From admission, onward)         Ordered     IP VTE HIGH RISK PATIENT  Once         08/26/23 1200     Place sequential compression device  Until discontinued         08/26/23 1200     heparin 25,000 units in dextrose 5% (100 units/ml) IV bolus from bag - ADDITIONAL PRN BOLUS - 60 units/kg  As needed (PRN)        Question:  Heparin Infusion Adjustment (DO NOT MODIFY ANSWER)  Answer:  \\[email or web address]\epic\Images\Pharmacy\HeparinInfusions\heparin LOW INTENSITY nomogram for [facility] [id number]    08/26/23 0641     heparin 25,000 units in dextrose 5% (100 units/ml) IV bolus from bag - ADDITIONAL PRN BOLUS - 30 units/kg  As needed (PRN)        Question:  Heparin Infusion Adjustment (DO NOT MODIFY ANSWER)  Answer:  \\[email or web address]\epic\Images\Pharmacy\HeparinInfusions\heparin LOW INTENSITY nomogram for [facility] [id number]    08/26/23 0641     heparin 25,000 units in dextrose 5% 250 mL (100 units/mL) infusion LOW INTENSITY nomogram - [facility]  Continuous        Question Answer Comment   Heparin Infusion Adjustment (DO NOT MODIFY ANSWER) \\[email or web address]\epic\Images\Pharmacy\HeparinInfusions\heparin LOW INTENSITY nomogram for [facility] [id number]    Begin at (in units/kg/hr) 12        08/26/23 0641                   On 08/26/2023, patient should be placed in hospital observation services under my care.        Nelly Warner DO  Department of Hospital Medicine  [facility] - Telemetry ([facility])

## 2023-08-26 NOTE — ASSESSMENT & PLAN NOTE
Patient had 1 episode of chest pain in ED which has since resolved without any nitro or pain medication  Possibly demand ischemia in setting of AFib RVR and COPD exacerbation  Trend troponin  Cardiology consulted

## 2023-08-26 NOTE — ED PROVIDER NOTES
ED Provider Note - 2023    History     Chief Complaint   Patient presents with    Shortness of Breath     Hx of COPD. Started today.    Weakness    Abdominal Pain     Right sided     Patient currently presents with concern regarding shortness of breath.  Patient describes associated generalized weakness.  Onset noted earlier today.  Patient describes a history of high blood pressure and CAD as well as COPD but has not been using her nebulizer treatment at home.  Patient is a daily cigarette smoker.  Patient denies associated chest pain.  Fever and chills are denied.      Review of patient's allergies indicates:   Allergen Reactions    Shellfish containing products      Past Medical History:   Diagnosis Date    Coronary artery disease     GERD (gastroesophageal reflux disease)     Hyperlipidemia     Hypertension      Past Surgical History:   Procedure Laterality Date    CARDIAC CATHETERIZATION N/A      SECTION, CLASSIC      HYSTERECTOMY       Family History   Problem Relation Age of Onset    Heart disease Mother     Heart disease Father     Hypertension Father     Heart disease Paternal Grandfather     Heart disease Paternal Grandmother     Heart disease Maternal Grandmother     Heart disease Maternal Grandfather      Social History     Tobacco Use    Smoking status: Every Day     Current packs/day: 0.80     Average packs/day: 0.8 packs/day for 50.0 years (40.0 ttl pk-yrs)     Types: Cigarettes    Smokeless tobacco: Never   Substance Use Topics    Alcohol use: Yes     Comment: beer when at camp    Drug use: No     Review of Systems   Constitutional:  Negative for chills and fever.   HENT:  Negative for congestion and rhinorrhea.    Respiratory:  Negative for cough and shortness of breath.    Cardiovascular:  Negative for chest pain and palpitations.   Gastrointestinal:  Negative for abdominal pain, diarrhea and vomiting.   Genitourinary:  Negative for difficulty urinating and dysuria.   Skin:  Negative  for color change and rash.   Neurological:  Negative for dizziness and light-headedness.   Hematological:  Negative for adenopathy. Does not bruise/bleed easily.       Physical Exam     Initial Vitals [08/25/23 1902]   BP Pulse Resp Temp SpO2   132/62 98 (!) 22 98.3 °F (36.8 °C) (!) 93 %      MAP       --         Vitals:    08/25/23 2132 08/25/23 2135 08/25/23 2155 08/25/23 2202   BP: (!) 89/60 (!) 97/54 (!) 101/57 101/76   Pulse: (!) 122 (!) 121 110    Resp: 20 20 20    Temp:       TempSrc:       SpO2: (!) 92% (!) 93% (!) 94%    Weight:       Height:        08/25/23 2204 08/25/23 2210 08/25/23 2233 08/25/23 2247   BP: 101/76 119/62 (!) 121/58 (!) 113/57   Pulse: (!) 115 107 104 97   Resp: 18 20 20 18   Temp:       TempSrc:       SpO2: (!) 93% (!) 94% (!) 94% (!) 92%   Weight:       Height:        08/25/23 2301 08/25/23 2316 08/25/23 2333 08/25/23 2348   BP: (!) 110/55 (!) 117/58 114/62 (!) 106/53   Pulse: 99 97 100 99   Resp: 20 20 18 20   Temp:       TempSrc:       SpO2: (!) 94% (!) 94% (!) 93% (!) 94%   Weight:       Height:        08/26/23 0001 08/26/23 0016 08/26/23 0101   BP: (!) 112/54 101/63 (!) 122/58   Pulse: 96 98 95   Resp: 20 18 20   Temp:      TempSrc:      SpO2: (!) 94% (!) 93% (!) 93%   Weight:      Height:        Physical Exam    ED Course   Critical Care    Date/Time: 8/26/2023 1:05 AM    Performed by: Talon Poole MD  Authorized by: Nestor Brown MD  Total critical care time (exclusive of procedural time) : 40 minutes  Critical care time was exclusive of separately billable procedures and treating other patients.  Critical care was necessary to treat or prevent imminent or life-threatening deterioration of the following conditions: cardiac failure.  Critical care was time spent personally by me on the following activities: ordering and review of radiographic studies, ordering and review of laboratory studies, ordering and performing treatments and interventions, evaluation of patient's  response to treatment, examination of patient, re-evaluation of patient's condition, pulse oximetry, discussions with consultants and development of treatment plan with patient or surrogate.                         MDM  Differential Diagnoses   Based on available history, the working differential diagnoses considered during this evaluation include but are not limited to  COPD exacerbation, CHF exacerbation, pneumonia, bronchitis .      LABS     Labs Reviewed   CBC W/ AUTO DIFFERENTIAL - Abnormal; Notable for the following components:       Result Value    RBC 3.84 (*)     Hematocrit 35.7 (*)     MCH 31.8 (*)     RDW 15.2 (*)     Gran # (ANC) 8.9 (*)     Immature Grans (Abs) 0.05 (*)     Lymph # 0.4 (*)     Gran % 91.9 (*)     Lymph % 3.8 (*)     Mono % 3.6 (*)     All other components within normal limits   COMPREHENSIVE METABOLIC PANEL - Abnormal; Notable for the following components:    Sodium 128 (*)     Potassium 3.4 (*)     Chloride 94 (*)     CO2 20 (*)     Glucose 176 (*)     Albumin 3.1 (*)     Total Bilirubin 1.3 (*)     Alkaline Phosphatase 53 (*)     eGFR 53.4 (*)     All other components within normal limits   URINALYSIS, REFLEX TO URINE CULTURE - Abnormal; Notable for the following components:    Appearance, UA Hazy (*)     Specific Gravity, UA >=1.030 (*)     Protein, UA 3+ (*)     Ketones, UA Trace (*)     Bilirubin (UA) 1+ (*)     Occult Blood UA 2+ (*)     All other components within normal limits    Narrative:     Specimen Source->Urine   TROPONIN I - Abnormal; Notable for the following components:    Troponin I 0.105 (*)     All other components within normal limits   B-TYPE NATRIURETIC PEPTIDE - Abnormal; Notable for the following components:     (*)     All other components within normal limits   URINALYSIS MICROSCOPIC - Abnormal; Notable for the following components:    Bacteria Moderate (*)     Hyaline Casts, UA 2 (*)     All other components within normal limits    Narrative:      Specimen Source->Urine   TROPONIN I - Abnormal; Notable for the following components:    Troponin I 0.107 (*)     All other components within normal limits     All available results from the labs ordered were independently reviewed.  BNP notable for elevation at 898, CBC unremarkable, CMP notable for hyponatremia, Troponin RESULT : normal, and UAR notable for proteinuria and occult blood as well as moderate bacteria and hyaline casts    Imaging     Imaging Results              X-Ray Chest PA And Lateral (Final result)  Result time 08/25/23 20:20:56      Final result by Xochilt Espinoza MD (08/25/23 20:20:56)                   Impression:      No active or adverse finding      Electronically signed by: Xochilt Espinoza  Date:    08/25/2023  Time:    20:20               Narrative:    EXAMINATION:  XR CHEST PA AND LATERAL    CLINICAL HISTORY:  Shortness of breath    TECHNIQUE:  Single frontal portable view of the chest was performed.    COMPARISON:  11/07/2022    FINDINGS:  No pulmonary consolidation or pleural effusion.  Mediastinal contour stable.                                    X-Rays:   Independently Interpreted Readings:   Chest X-Ray: Normal heart size.  No infiltrates.  No acute abnormalities.       EKG   EKG Readings: (Independently Interpreted)   Initial Reading: No STEMI. Rhythm: Atrial Fibrillation. Heart Rate: 144. Ectopy: No Ectopy. Axis: Left Axis Deviation. Other Impression: Diffuse ST depression noted   Other EKG Interpretations: Repeat EKG at 9:39 p.m. is notable for persistent atrial fibrillation with rapid ventricular response now with a rate of 117.  No evidence for STEMI.  ST depression overall improved though some ST depression and T-wave inversion is noted in the lateral leads.       ED Management/Discussion     Medications   diltiaZEM 125 mg in D5W 125 mL infusion (5 mg/hr Intravenous New Bag 8/25/23 2203)   albuterol-ipratropium 2.5 mg-0.5 mg/3 mL nebulizer solution 3 mL (3 mLs  Nebulization Given 8/25/23 1945)   albuterol-ipratropium 2.5 mg-0.5 mg/3 mL nebulizer solution 3 mL (3 mLs Nebulization Given 8/25/23 1939)   albuterol-ipratropium 2.5 mg-0.5 mg/3 mL nebulizer solution 3 mL (3 mLs Nebulization Given 8/25/23 1933)   predniSONE tablet 40 mg (40 mg Oral Given 8/25/23 1942)   diltiaZEM injection 15.5 mg (15.5 mg Intravenous Given 8/25/23 2047)            ED Course as of 08/26/23 0111   Fri Aug 25, 2023   2042 Although the patient's heart rate was initially normal, cardiac monitoring demonstrated tachycardia with an irregular rate upon return from x-ray.  At that time we acquired her EKG which demonstrated new onset atrial fibrillation with rapid ventricular response.  Cardizem has been ordered. [ST]   2135 Heart rate initially improved with a rate in the 110s and returned to sinus rhythm though the blood pressure dropped transiently to 8960.  She does appear to be quickly rebounding without any intervention so we will continue to monitor at this time. [ST]   2155 Patient's blood pressure now markedly improved but she is again in atrial fibrillation with RVR.  At this point we will initiate a Cardizem infusion and plan for admission. [ST]      ED Course User Index  [ST] Talon Poole MD         The patient's list of active medical problems, social history, medications, and allergies as documented per RN staff has been reviewed.             All available findings were reviewed with the patient/family in detail along with the indications for hospitalization in order to receive cardiac monitoring, serial troponin levels, and consideration for cardiology consultation for evaluation of new onset AFib and possible  echocardiography .  All remaining questions and concerns were addressed at this time and the patient/family communicates understanding and agrees to proceed accordingly.  Similarly, all pertinent details of the encounter were discussed with Dr Brown who agrees to receive the  patient at [facility] for further care as outlined above.  The patient will be transferred by EMS secondary to a need for ongoing cardiac monitoring and Cardizem drip en route.        CLINICAL IMPRESSION    ICD-10-CM ICD-9-CM   1. Atrial fibrillation with rapid ventricular response  I48.91 427.31   2. SOB (shortness of breath)  R06.02 786.05   3. Tachycardia  R00.0 785.0   4. COPD with acute exacerbation  J44.1 491.21   5. Pulmonary edema with diastolic CHF, NYHA class 3  I50.30 428.1     428.30          ED Disposition Condition    Observation Stable               Talon Poole MD  08/26/23 0111

## 2023-08-26 NOTE — HPI
Yelena Briceño is a 72 y.o. female with CAD, GERD, Hyperlipidemia, COPD and Hypertension who initially presented to [facility] ED on 8/25/2023 with complaints of progressive shortness of breath and weakness.  Family reports that for several days prior to ED presentation patient had been getting noticeably weaker having difficulty ambulating.  She was apparently complaining of feeling cold and unable to get warm.  Patient also reports increasing cough with greenish sputum and worsening shortness of breath.  At the time, she denied any chest pain, palpitation.  On arrival in ED, patient's heart rate was initially normal. However shortly after she was noted to have tachycardia and EKG showed new onset atrial fibrillation with RVR.  She was given a dose of Cardizem with improvement of her heart rate and converted back to sinus rhythm although her blood pressure apparently dropped transiently to 89/60.  BP normalized, however she went back into AFib RVR.  Subsequently initiated on Cardizem infusion.  Patient admitted to an episode of substernal chest pain while in ED described as pressure which has since resolved.  Additionally patient was given neb treatment x3 as well as prednisone 40 mg in the ED. Patient transferred to [facility] for further management of new onset AFib RVR and COPD exacerbation.      PCP: Prakash Jo MD    SDM:  DaughterSameera    CODE STATUS:  Full code, discussed with patient and daughter

## 2023-08-27 PROBLEM — A41.9 SEPTIC SHOCK: Status: RESOLVED | Noted: 2023-01-01 | Resolved: 2023-01-01

## 2023-08-27 PROBLEM — A41.9 SEPTIC SHOCK: Status: ACTIVE | Noted: 2023-01-01

## 2023-08-27 PROBLEM — R79.89 ELEVATED TROPONIN: Status: RESOLVED | Noted: 2023-01-01 | Resolved: 2023-01-01

## 2023-08-27 PROBLEM — I50.9 ACUTE EXACERBATION OF CHF (CONGESTIVE HEART FAILURE): Status: ACTIVE | Noted: 2023-01-01

## 2023-08-27 PROBLEM — I50.9 ACUTE EXACERBATION OF CHF (CONGESTIVE HEART FAILURE): Status: RESOLVED | Noted: 2023-01-01 | Resolved: 2023-01-01

## 2023-08-27 PROBLEM — E87.1 HYPONATREMIA: Status: RESOLVED | Noted: 2023-01-01 | Resolved: 2023-01-01

## 2023-08-27 PROBLEM — R06.89 DYSPNEA AND RESPIRATORY ABNORMALITIES: Chronic | Status: RESOLVED | Noted: 2020-11-16 | Resolved: 2023-01-01

## 2023-08-27 PROBLEM — J96.02 ACUTE RESPIRATORY FAILURE WITH HYPOXIA AND HYPERCARBIA: Status: RESOLVED | Noted: 2023-01-01 | Resolved: 2023-01-01

## 2023-08-27 PROBLEM — N17.9 AKI (ACUTE KIDNEY INJURY): Status: RESOLVED | Noted: 2023-01-01 | Resolved: 2023-01-01

## 2023-08-27 PROBLEM — G93.41 ENCEPHALOPATHY, METABOLIC: Status: ACTIVE | Noted: 2023-01-01

## 2023-08-27 PROBLEM — J96.01 ACUTE RESPIRATORY FAILURE WITH HYPOXIA AND HYPERCARBIA: Status: RESOLVED | Noted: 2023-01-01 | Resolved: 2023-01-01

## 2023-08-27 PROBLEM — I48.91 ATRIAL FIBRILLATION WITH RVR: Status: RESOLVED | Noted: 2023-01-01 | Resolved: 2023-01-01

## 2023-08-27 PROBLEM — R65.21 SEPTIC SHOCK: Status: RESOLVED | Noted: 2023-01-01 | Resolved: 2023-01-01

## 2023-08-27 PROBLEM — J44.1 COPD EXACERBATION: Status: RESOLVED | Noted: 2023-01-01 | Resolved: 2023-01-01

## 2023-08-27 PROBLEM — R06.00 DYSPNEA AND RESPIRATORY ABNORMALITIES: Chronic | Status: RESOLVED | Noted: 2020-11-16 | Resolved: 2023-01-01

## 2023-08-27 PROBLEM — R65.21 SEPTIC SHOCK: Status: ACTIVE | Noted: 2023-01-01

## 2023-08-27 PROBLEM — E87.1 HYPONATREMIA: Status: ACTIVE | Noted: 2023-01-01

## 2023-08-27 PROBLEM — N17.9 AKI (ACUTE KIDNEY INJURY): Status: ACTIVE | Noted: 2023-01-01

## 2023-08-27 PROBLEM — G93.41 ENCEPHALOPATHY, METABOLIC: Status: RESOLVED | Noted: 2023-01-01 | Resolved: 2023-01-01

## 2023-08-27 PROBLEM — J96.02 ACUTE RESPIRATORY FAILURE WITH HYPOXIA AND HYPERCARBIA: Status: ACTIVE | Noted: 2023-01-01

## 2023-08-27 NOTE — SUBJECTIVE & OBJECTIVE
Past Medical History:   Diagnosis Date    Coronary artery disease     GERD (gastroesophageal reflux disease)     Hyperlipidemia     Hypertension        Past Surgical History:   Procedure Laterality Date    CARDIAC CATHETERIZATION N/A      SECTION, CLASSIC      HYSTERECTOMY         Review of patient's allergies indicates:   Allergen Reactions    Shellfish containing products        Family History       Problem Relation (Age of Onset)    Heart disease Mother, Father, Paternal Grandfather, Paternal Grandmother, Maternal Grandmother, Maternal Grandfather    Hypertension Father          Tobacco Use    Smoking status: Every Day     Current packs/day: 0.80     Average packs/day: 0.8 packs/day for 50.0 years (40.0 ttl pk-yrs)     Types: Cigarettes    Smokeless tobacco: Never   Substance and Sexual Activity    Alcohol use: Yes     Comment: beer when at camp    Drug use: No    Sexual activity: Not Currently         Review of Systems   Unable to perform ROS: Acuity of condition     Objective:     Vital Signs (Most Recent):  Temp: 97.5 °F (36.4 °C) (23 0407)  Pulse: (!) 119 (23 1019)  Resp: (!) 24 (23 0900)  BP: (!) 153/93 (23 0858)  SpO2: (!) 94 % (23 0900) Vital Signs (24h Range):  Temp:  [97.5 °F (36.4 °C)-98.9 °F (37.2 °C)] 97.5 °F (36.4 °C)  Pulse:  [] 119  Resp:  [16-24] 24  SpO2:  [82 %-96 %] 94 %  BP: (104-153)/(55-93) 153/93     Weight: 61.3 kg (135 lb 3.2 oz)  Body mass index is 23.21 kg/m².      Intake/Output Summary (Last 24 hours) at 2023 1112  Last data filed at 2023 1733  Gross per 24 hour   Intake 110.79 ml   Output --   Net 110.79 ml        Physical Exam  Vitals reviewed.   Constitutional:       Appearance: She is ill-appearing and toxic-appearing.      Comments: Unresponsive- GCS 3   HENT:      Head: Normocephalic and atraumatic.      Mouth/Throat:      Mouth: Mucous membranes are dry.      Pharynx: Oropharynx is clear.   Eyes:      Conjunctiva/sclera:  Conjunctivae normal.      Pupils: Pupils are equal, round, and reactive to light.      Comments: No blink to threat or tracking   Cardiovascular:      Rate and Rhythm: Tachycardia present. Rhythm irregular.      Pulses: Normal pulses.      Comments: Fingertips and feet are cool with cyanotic toes and fingers   Pulmonary:      Comments: RSB pattern. Mild wheezing with decently open airways, rales and rhonchi  Abdominal:      General: Abdomen is flat.      Palpations: Abdomen is soft.   Genitourinary:     Comments: Indwelling urinary catheter with dark yellow urine  Musculoskeletal:         General: No deformity.      Cervical back: Neck supple.      Right lower leg: No edema.      Left lower leg: No edema.   Lymphadenopathy:      Cervical: No cervical adenopathy.   Skin:     General: Skin is dry.      Comments: Cool to touch   Neurological:      Comments: No eye opening, no response to painful stimuli, no attempts to communicate.           Vents:  Vent Mode: A/C (08/27/23 0900)  Set Rate: 24 BPM (08/27/23 0900)  Vt Set: 350 mL (08/27/23 0900)  PEEP/CPAP: 5 cmH20 (08/27/23 0900)  Oxygen Concentration (%): 100 (08/27/23 0914)  Peak Airway Pressure: 31 cmH20 (08/27/23 0900)  Plateau Pressure: 0 cmH20 (08/27/23 0900)  Total Ve: 8.26 L/m (08/27/23 0900)  Negative Inspiratory Force (cm H2O): 0 (08/27/23 0900)  F/VT Ratio<105 (RSBI): (!) 69.77 (08/27/23 0900)    Lines/Drains/Airways       Central Venous Catheter Line  Duration             Percutaneous Central Line Insertion/Assessment - Triple Lumen  08/27/23 0930 Internal Jugular Right <1 day              Drain  Duration                  Urethral Catheter 08/27/23 0915 Latex 16 Fr. <1 day              Airway  Duration                  Airway - Non-Surgical 08/27/23 0857 Endotracheal Tube <1 day              Peripheral Intravenous Line  Duration                  Peripheral IV - Single Lumen 08/25/23 1931 20 G Right Antecubital 1 day                    Significant  Labs:    CBC/Anemia Profile:  Recent Labs   Lab 08/25/23  1932 08/26/23  0719 08/27/23  0314   WBC 9.64 7.96 2.66*   HGB 12.2 12.5 13.4   HCT 35.7* 35.7* 39.2    133* 98*   MCV 93 91 93   RDW 15.2* 15.0* 15.4*        Chemistries:  Recent Labs   Lab 08/26/23  1159 08/27/23  0314 08/27/23  0934   * 125* 128*   K 4.0 3.5 3.8   CL 93* 93* 94*   CO2 21* 19* 22*   BUN 26* 38* 44*   CREATININE 1.1 1.6* 2.2*   CALCIUM 9.0 8.7 7.9*   ALBUMIN 3.0* 2.6* 2.0*   PROT 7.0 6.6 5.5*   BILITOT 1.3* 1.3* 1.0   ALKPHOS 42* 28* 21*   ALT 12 12 10   AST 54* 45* 44*   MG  --  1.7 2.0   PHOS  --   --  6.7*       All pertinent labs within the past 24 hours have been reviewed.    Significant Imaging:   I have reviewed all pertinent imaging results/findings within the past 24 hours.

## 2023-08-27 NOTE — CONSULTS
[facility] - Intensive Care ([facility])  Critical Care Medicine  Consult Note    Patient Name: Yelena Briceño  MRN: [id number]  Admission Date: 8/25/2023  Hospital Length of Stay: 1 days  Code Status: Full Code  Attending Physician: ERICA Goldstein   Primary Care Provider: Prakash Jo MD   Principal Problem: Atrial fibrillation with RVR      Subjective:     HPI:  Information obtained from chart review, daughter Heaven at bedside, and hospital medicine provider Dr. Warner.     72-year-old female with a known past medical history of COPD, tobacco abuse (current smoker > 40 years), CAD, GERD, HTN, cecum cancer s/p right hemicolectomy (2018), MDD, and dementia (daughter reports she can complete ADLs independently, memory issues and confusion at times) who presented to [facility] ED 8/25/2023 with complaints of SOB. While in the ED, patient developed A-fib with RVR and was placed on Cardizem infusion. She was also treated with bronchodilators and prednisone. She was admitted to [facility] telemetry unit by Dr. Warner. Overnight, patient with desaturations in the 80s and decreased mental status. This morning, ABG revealing hypercapnia and severe mixed acidosis. She was placed on AVAPS and transferred to ICU. Unfortunately, after about 30 minutes, mental status remained very poor and patient was intubated for airway protection and respiratory failure. ICU consulted for ongoing care.     Intubated upon arrival to ICU. CVC placed for shock requiring levophed. Also initiated on Amiodarone for Afib with RVR as cardizem was stopped due to shock.       Hospital/ICU Course:  8/27: transferred to ICU for hypercapnic respiratory failure, severe hypoxemia, and encephalopathy. Intubated and CVC placed for levophed upon arrival. Work up in progress       Past Medical History:   Diagnosis Date    Coronary artery disease     GERD (gastroesophageal reflux disease)     Hyperlipidemia     Hypertension        Past Surgical History:   Procedure  Laterality Date    CARDIAC CATHETERIZATION N/A      SECTION, CLASSIC      HYSTERECTOMY         Review of patient's allergies indicates:   Allergen Reactions    Shellfish containing products        Family History       Problem Relation (Age of Onset)    Heart disease Mother, Father, Paternal Grandfather, Paternal Grandmother, Maternal Grandmother, Maternal Grandfather    Hypertension Father          Tobacco Use    Smoking status: Every Day     Current packs/day: 0.80     Average packs/day: 0.8 packs/day for 50.0 years (40.0 ttl pk-yrs)     Types: Cigarettes    Smokeless tobacco: Never   Substance and Sexual Activity    Alcohol use: Yes     Comment: beer when at camp    Drug use: No    Sexual activity: Not Currently         Review of Systems   Unable to perform ROS: Acuity of condition     Objective:     Vital Signs (Most Recent):  Temp: 97.5 °F (36.4 °C) (23 0407)  Pulse: (!) 119 (23 1019)  Resp: (!) 24 (23 0900)  BP: (!) 153/93 (23 0858)  SpO2: (!) 94 % (23 0900) Vital Signs (24h Range):  Temp:  [97.5 °F (36.4 °C)-98.9 °F (37.2 °C)] 97.5 °F (36.4 °C)  Pulse:  [] 119  Resp:  [16-24] 24  SpO2:  [82 %-96 %] 94 %  BP: (104-153)/(55-93) 153/93     Weight: 61.3 kg (135 lb 3.2 oz)  Body mass index is 23.21 kg/m².      Intake/Output Summary (Last 24 hours) at 2023 1112  Last data filed at 2023 1733  Gross per 24 hour   Intake 110.79 ml   Output --   Net 110.79 ml        Physical Exam  Vitals reviewed.   Constitutional:       Appearance: She is ill-appearing and toxic-appearing.      Comments: Unresponsive- GCS 3   HENT:      Head: Normocephalic and atraumatic.      Mouth/Throat:      Mouth: Mucous membranes are dry.      Pharynx: Oropharynx is clear.   Eyes:      Conjunctiva/sclera: Conjunctivae normal.      Pupils: Pupils are equal, round, and reactive to light.      Comments: No blink to threat or tracking   Cardiovascular:      Rate and Rhythm: Tachycardia  present. Rhythm irregular.      Pulses: Normal pulses.      Comments: Fingertips and feet are cool with cyanotic toes and fingers   Pulmonary:      Comments: RSB pattern. Mild wheezing with decently open airways, rales and rhonchi  Abdominal:      General: Abdomen is flat.      Palpations: Abdomen is soft.   Genitourinary:     Comments: Indwelling urinary catheter with dark yellow urine  Musculoskeletal:         General: No deformity.      Cervical back: Neck supple.      Right lower leg: No edema.      Left lower leg: No edema.   Lymphadenopathy:      Cervical: No cervical adenopathy.   Skin:     General: Skin is dry.      Comments: Cool to touch   Neurological:      Comments: No eye opening, no response to painful stimuli, no attempts to communicate.           Vents:  Vent Mode: A/C (08/27/23 0900)  Set Rate: 24 BPM (08/27/23 0900)  Vt Set: 350 mL (08/27/23 0900)  PEEP/CPAP: 5 cmH20 (08/27/23 0900)  Oxygen Concentration (%): 100 (08/27/23 0914)  Peak Airway Pressure: 31 cmH20 (08/27/23 0900)  Plateau Pressure: 0 cmH20 (08/27/23 0900)  Total Ve: 8.26 L/m (08/27/23 0900)  Negative Inspiratory Force (cm H2O): 0 (08/27/23 0900)  F/VT Ratio<105 (RSBI): (!) 69.77 (08/27/23 0900)    Lines/Drains/Airways       Central Venous Catheter Line  Duration             Percutaneous Central Line Insertion/Assessment - Triple Lumen  08/27/23 0930 Internal Jugular Right <1 day              Drain  Duration                  Urethral Catheter 08/27/23 0915 Latex 16 Fr. <1 day              Airway  Duration                  Airway - Non-Surgical 08/27/23 0857 Endotracheal Tube <1 day              Peripheral Intravenous Line  Duration                  Peripheral IV - Single Lumen 08/25/23 1931 20 G Right Antecubital 1 day                    Significant Labs:    CBC/Anemia Profile:  Recent Labs   Lab 08/25/23  1932 08/26/23  0719 08/27/23  0314   WBC 9.64 7.96 2.66*   HGB 12.2 12.5 13.4   HCT 35.7* 35.7* 39.2    133* 98*   MCV 93 91  93   RDW 15.2* 15.0* 15.4*        Chemistries:  Recent Labs   Lab 08/26/23  1159 08/27/23  0314 08/27/23  0934   * 125* 128*   K 4.0 3.5 3.8   CL 93* 93* 94*   CO2 21* 19* 22*   BUN 26* 38* 44*   CREATININE 1.1 1.6* 2.2*   CALCIUM 9.0 8.7 7.9*   ALBUMIN 3.0* 2.6* 2.0*   PROT 7.0 6.6 5.5*   BILITOT 1.3* 1.3* 1.0   ALKPHOS 42* 28* 21*   ALT 12 12 10   AST 54* 45* 44*   MG  --  1.7 2.0   PHOS  --   --  6.7*       All pertinent labs within the past 24 hours have been reviewed.    Significant Imaging:   I have reviewed all pertinent imaging results/findings within the past 24 hours.      ABG  Recent Labs   Lab 08/27/23  0753   PH 7.195*   PO2 45*   PCO2 67.8*   HCO3 26.2   BE -2     Assessment/Plan:     Neuro  Encephalopathy, metabolic  Decreased mental status overnight- this morning patient was unresponsive   Likely multifactorial with hypercapnia, sepsis and metabolic acidosis   CT head pending   Ammonia pending   Intubated 8/27 for airway protection  Neuro checks     Pulmonary  Acute respiratory failure with hypoxia and hypercarbia  Patient with Hypercapnic and Hypoxic Respiratory failure which is Acute.  she is not on home oxygen. Supplemental oxygen was provided and noted- Vent Mode: A/C  Oxygen Concentration (%):  [] 100  Resp Rate Total:  [24 br/min] 24 br/min  Vt Set:  [350 mL] 350 mL  PEEP/CPAP:  [5 cmH20] 5 cmH20  Mean Airway Pressure:  [11 cmH20] 11 cmH20  Signs/symptoms of respiratory failure include- tachypnea, wheezing, lethargy and cyanosis. Contributing diagnoses includes - CHF, COPD and sepsisLabs and images were reviewed. Patient Has recent ABG, which has been reviewed.   Will treat underlying causes and adjust management of respiratory failure as follows-   Initially requiring supplemental oxygen and was treated for COPD exacerbation with steroids and bronchodilators   Intubated 8/27 with hypercapnia + encephalopathy  CXR with worsening bilateral infiltrates, this is associated with  neutropenia + lactic acidosis + shock + elevated procalcitonin   Ventilator settings adjusted, follow up ABG pending   Sputum culture and MRSA PCR pending   Zosyn and Zyvox ordered   Fentanyl and Precedex for sedation, RASS goal -1  ABG and CXR as needed   Evaluate daily for appropriateness of SAT/SBT     COPD exacerbation  Presented with SOB, wheezing and diminished air movement   Being treated with supplemental oxygen, steroids and bronchodilators   Worsening hypoxemia and hypercapnia today and has since been intubated  See problem: acute hypercapnic and hypoxemic respiratory failure     Cardiac/Vascular  * Atrial fibrillation with RVR  New onset Afib with RVR, developed in IBV ED  Initiated on Cardizem and Heparin infusions  Cardizem stopped 2/2 shock  Amiodarone loading + infusion   Given IVFs   Cardiology consulted     Acute exacerbation of CHF (congestive heart failure)  Echo 8/26 with EF 55-60% with LV grade 2 diastolic dysfunction, normal RV function, pulm HTN  Despite elevated BNP on admission, appears to be on the dry side and has received some fluids  Strict I/O  Daily weights   Cardiology following     Elevated troponin  In setting of new onset Afib and now respiratory failure and shock   Suspect demand ischemia from critical illness and arrhythmia   Cardiology following     Renal/  ALEX (acute kidney injury)  Cr doubled since yesterday- today 2.2  IVF bolus 500 mL x 2  Urine studies pending   Levophed for MAP > 65 mmHg   Ballard catheter for strict I/O  Renal dose medications  Serial chemistries     Hyponatremia  Despite elevated BNP on admission, patient appears dry   Given 500 mL NS upon arrival to ICU and ordered additional 500 ml NS with return of LA results  Na up trending - last check 128   Monitor closely     ID  Septic shock  Suspected source: suspected pneumonia   IVF resuscitation: did not received 30 mL/kg with CHF history: has received 1L NS   LA initially 3.1, repeat is  pending  Procalcitonin elevated renal dysfunction could be contributing + neutropenia   Hypotensive despite fluids with evidence of hypoperfusion   CVC placed 8/27 and initiated on levophed to keep MAP > 65 mmHg   Zosyn and Zyvox  Sputum, blood and urinalysis pending   MRSA PCR pending   CT head/chest/abd/pelvis is pending     DVT prophylaxis: heparin infusion  GI prophylaxis: protonix  Code status: Full Code  Disposition: continue supportive care with hemodynamic and pulmonary support as we as antibiotics while work up in progress. Discussed plan with daughter, Sameera, who was at bedside prior to transfer to ICU. Patient would be agreeable to intubation and resuscitation initially in attempts to treat reversible problems; however, patient would not find it acceptable to remain on prolonged ventilation or undergo trach/peg placement.       Critical Care Time: 65 minutes  Critical secondary to Patient has a condition that poses threat to life and bodily function: septic shock, respiratory failure, ALEX, encephalopathy and Afib with RVR: requiring management of high risk medication infusions and mechanical ventilation. At risk for further hemodynamic, pulmonary, and/or neurologic decline, progressive multi-organ failure, life-threatening metabolic derangements, lethal arrhythmias, and death.      Critical care was time spent personally by me on the following activities: development of treatment plan with patient or surrogate and bedside caregivers, discussions with consultants, evaluation of patient's response to treatment, examination of patient, ordering and performing treatments and interventions, ordering and review of laboratory studies, ordering and review of radiographic studies, pulse oximetry, re-evaluation of patient's condition. This critical care time did not overlap with that of any other provider or involve time for any procedures.    Critical Care team will assume primary management while in ICU.       Reema Jasso NP  Critical Care Medicine  [facility] - Intensive Care ([facility])

## 2023-08-27 NOTE — PLAN OF CARE
Nutrition Recs: 8/27  1. Recommend pt be initiated onto EN when medically appropriate.   - Nova source Renal. Initiate at 10 mL/hr, advance as pt tolerates. Aim for goal rate of 25 mL/hr.   - Formula will provide: 1200 kcal (91% EEN), 54.42g Protein (100% EPN), 430.2 mL/day.   - 145 mL fwf q4hrs (870 mL) or per MD/NP.   - Free water + FWF = 1300.2 mL/day (99% EFN).   - Check Mg, Na, K+, Phos, and Glu before and during initiation, correct as indicated.   2. Weigh daily.    Goals:   1. Pt will be initiated onto EN within 24 hrs.   2. Pt will consume >75% of EEN and EPN prior to RD follow up.  Nutrition Goal Status: new  Communication of RD Recs: other (comment) (POC: Sticky Note)  Jesus Kwon, Registration Eligible, Provisional LDN

## 2023-08-27 NOTE — PROCEDURES
"Yelena Briceño is a 72 y.o. female patient.    Temp: 97.5 °F (36.4 °C) (08/27/23 0407)  Pulse: 92 (08/27/23 0900)  Resp: (!) 24 (08/27/23 0900)  BP: (!) 153/93 (08/27/23 0858)  SpO2: (!) 94 % (08/27/23 0900)  Weight: 61.3 kg (135 lb 3.2 oz) (08/27/23 0900)  Height: 5' 4" (162.6 cm) (08/27/23 0900)       Central Line    Date/Time: 8/27/2023 9:05 AM    Performed by: Reema Jasso NP  Authorized by: Artur Guillen MD    Location procedure was performed:  [facility] INTENSIVE CARE UNIT  Consent Done ?:  Yes  Time out complete?: Verified correct patient, procedure, equipment, staff, and site/side    Indications:  Hemodynamic monitoring and med administration  Anesthesia:  Local infiltration  Local anesthetic:  Lidocaine 1% without epinephrine  Preparation:  Skin prepped with ChloraPrep  Skin prep agent dried: Skin prep agent completely dried prior to procedure    Sterile barriers: All five maximal sterile barriers used - gloves, gown, cap, mask and large sterile sheet    Hand hygiene: Hand hygiene performed immediately prior to central venous catheter insertion    Location:  Right internal jugular  Catheter type:  Triple lumen  Catheter size:  7 Fr  Ultrasound guidance: Yes    Needle advanced into vessel with real time ultrasound guidance.    Guidewire confirmed in vessel.    Image recorded and saved.    Steril sheath on probe.    Sterile gel used.  Manometry: No    Number of attempts:  1  Securement:  Line sutured, chlorhexidine patch, sterile dressing applied and blood return through all ports  Complications: No    XRay:  Placement verified by x-ray and successful placement  Adverse Events:  NoneTermination Site: superior vena cava      8/27/2023    "

## 2023-08-27 NOTE — NURSING
Pt in afib RVR during shift- on amio. Unsuccessful cardioversion x3 shocks by MD.  Increased pressor needs. On vaso and levo. Providers aware.   UOP 600ml during shift. Provider aware.  BSWR not started.   Many family members visiting at bedside.    TOD 23 at 6908. Called by Dr. Guillen.    's office called. Ayan Toro released body.  Family or staff to call Ayan Toro with  home name at [phone].

## 2023-08-27 NOTE — ASSESSMENT & PLAN NOTE
Patient had 1 episode of chest pain in ED which has since resolved without any nitro or pain medication  Possibly demand ischemia in setting of AFib RVR and COPD exacerbation  Troponin trended up further  Cardiology consulted

## 2023-08-27 NOTE — ASSESSMENT & PLAN NOTE
Despite elevated BNP on admission, patient appears dry   Given 500 mL NS upon arrival to ICU and ordered additional 500 ml NS with return of LA results  Na up trending - last check 128   Monitor closely

## 2023-08-27 NOTE — NURSING
Emergency titrating levo while switching from regular to concentrated levo. MD aware. See flowsheet for titrations.

## 2023-08-27 NOTE — ASSESSMENT & PLAN NOTE
New onset Afib with RVR, developed in IBV ED  Initiated on Cardizem and Heparin infusions  Cardizem stopped 2/2 shock  Amiodarone loading + infusion   Given IVFs   Cardiology consulted

## 2023-08-27 NOTE — ASSESSMENT & PLAN NOTE
Patient with Hypoxic Respiratory failure which is Acute.  she is not on home oxygen. Supplemental oxygen was provided and noted- Oxygen Concentration (%):  [] 100    .   Signs/symptoms of respiratory failure include- increased work of breathing and wheezing. Contributing diagnoses includes - CHF and COPD Labs and images were reviewed. Patient Has recent ABG, which has been reviewed. Will treat underlying causes and adjust management of respiratory failure as follows-     Treat COPD exacerbation and CHF  Repeat ABG shows hypercapnia with pCO2 of 67.8, significantly increased from pCO2 of 33.3 yesterday  Started on BiPAP  Onto transferred to ICU for further management

## 2023-08-27 NOTE — ASSESSMENT & PLAN NOTE
Presented with SOB, wheezing and diminished air movement   Being treated with supplemental oxygen, steroids and bronchodilators   Worsening hypoxemia and hypercapnia today and has since been intubated  See problem: acute hypercapnic and hypoxemic respiratory failure

## 2023-08-27 NOTE — SUBJECTIVE & OBJECTIVE
Interval History:  Overnight patient required increased O2 and daughter reports that she was becoming was less alert.  Her this morning room staff was unable to obtain O2 level per pulse ox.  Stat ABG ordered.  Patient examined and found to be altered, not answering any questions or following any commands.  ABG showed new hypercapnia.  Discussed with critical care team.  Plan for transfer to ICU for further management.  Discussed plan of care with patient's daughter at bedside.    Review of Systems   Unable to perform ROS: Mental status change     Objective:     Vital Signs (Most Recent):  Temp: 97.5 °F (36.4 °C) (08/27/23 0407)  Pulse: 101 (08/27/23 0803)  Resp: (!) 24 (08/27/23 0803)  BP: (!) 104/57 (08/27/23 0407)  SpO2: (!) 89 % (08/27/23 0803) Vital Signs (24h Range):  Temp:  [97.5 °F (36.4 °C)-98.9 °F (37.2 °C)] 97.5 °F (36.4 °C)  Pulse:  [] 101  Resp:  [16-25] 24  SpO2:  [82 %-93 %] 89 %  BP: (104-138)/(55-61) 104/57     Weight: 61.7 kg (136 lb)  Body mass index is 23.34 kg/m².    Intake/Output Summary (Last 24 hours) at 8/27/2023 0811  Last data filed at 8/26/2023 1733  Gross per 24 hour   Intake 110.79 ml   Output --   Net 110.79 ml         Physical Exam  Vitals and nursing note reviewed. Exam conducted with a chaperone present.   HENT:      Head: Normocephalic and atraumatic.      Right Ear: External ear normal.      Left Ear: External ear normal.   Eyes:      Pupils: Pupils are equal, round, and reactive to light.   Cardiovascular:      Rate and Rhythm: Normal rate. Rhythm irregular.   Pulmonary:      Effort: Pulmonary effort is normal. No accessory muscle usage or respiratory distress.      Breath sounds: Rhonchi and rales present. No wheezing.   Abdominal:      General: Bowel sounds are normal. There is no distension.      Palpations: Abdomen is soft.      Tenderness: There is no abdominal tenderness. There is no guarding or rebound.   Musculoskeletal:      Cervical back: Neck supple.      Right  lower leg: No edema.      Left lower leg: No edema.   Skin:     General: Skin is warm and dry.   Neurological:      Mental Status: She is alert.             Significant Labs: All pertinent labs within the past 24 hours have been reviewed.  CBC:   Recent Labs   Lab 08/25/23 1932 08/26/23  0719 08/27/23 0314   WBC 9.64 7.96 2.66*   HGB 12.2 12.5 13.4   HCT 35.7* 35.7* 39.2    133* 98*     CMP:   Recent Labs   Lab 08/25/23 1932 08/26/23  1159 08/27/23 0314   * 127* 125*   K 3.4* 4.0 3.5   CL 94* 93* 93*   CO2 20* 21* 19*   * 131* 126*   BUN 18 26* 38*   CREATININE 1.1 1.1 1.6*   CALCIUM 8.7 9.0 8.7   PROT 7.0 7.0 6.6   ALBUMIN 3.1* 3.0* 2.6*   BILITOT 1.3* 1.3* 1.3*   ALKPHOS 53* 42* 28*   AST 38 54* 45*   ALT 10 12 12   ANIONGAP 14 13 13         Significant Imaging: I have reviewed all pertinent imaging results/findings within the past 24 hours.

## 2023-08-27 NOTE — NURSING
Requiring more blood pressure support. Dr. HAN and Reema MILLER NP called to bedside. Vasopressin started. Bicarb push given. Levo increased. BP recovered.   Remaining in afib RVR, -130's. despite amio gtt and 2 doses of digoxin.  Dr. Serrato- cardiology called to bedside. Cardioverted, shock x3- unsuccessful.

## 2023-08-27 NOTE — ASSESSMENT & PLAN NOTE
Cont amio drip, s/p Dig IV  S/p DCCV x 3 without NSR  Can discuss DCCV in 24-48 hrs if sepsis improves

## 2023-08-27 NOTE — ASSESSMENT & PLAN NOTE
Decreased mental status overnight- this morning patient was unresponsive   Likely multifactorial with hypercapnia, sepsis and metabolic acidosis   CT head pending   Ammonia pending   Intubated 8/27 for airway protection  Neuro checks

## 2023-08-27 NOTE — SIGNIFICANT EVENT
Death note:    Fixed dilated pupils  No corneals  No spont respiration  Asystole    Patient was pronounced at 16:58    Family at the bedside.

## 2023-08-27 NOTE — HOSPITAL COURSE
: transferred to ICU for hypercapnic respiratory failure, severe hypoxemia, and encephalopathy. Intubated and CVC placed for levophed upon arrival. Initiated on broad spectrum antibiotics and given additional IVFs with work up in progress. CT chest with bilateral patchy opacities- infection vs septic emobli is most concerning. CT abd/pelvis negative. Throughout the course of the day, patient with persistent hypercapnia and acidosis despite multiple vent changes. Also required up titrating of levophed and addition of levophed. Developed renal failure with minimal urine output throughout the day. Afib with RVR persisted despite multiple medications and cardioversion attempt x 3 by Cardiology. Discussed concern for ongoing decline and poor prognosis. Family decided to change patient's code status to DNR with plans to transition to comfort care once all family en route have arrived. Despite best medical care available, Ms. Briceño  with family at bedside 2023 2495. Cause of death acute hypercapnic and hypoxic respiratory failure.

## 2023-08-27 NOTE — ASSESSMENT & PLAN NOTE
Cr doubled since yesterday- today 2.2  IVF bolus 500 mL x 2  Urine studies pending   Levophed for MAP > 65 mmHg   Ballard catheter for strict I/O  Renal dose medications  Serial chemistries

## 2023-08-27 NOTE — HPI
Information obtained from chart review, daughter Heaven at bedside, and hospital medicine provider Dr. Warner.     72-year-old female with a known past medical history of COPD, tobacco abuse (current smoker > 40 years), CAD, GERD, HTN, cecum cancer s/p right hemicolectomy (2018), MDD, and dementia (daughter reports she can complete ADLs independently, memory issues and confusion at times) who presented to [facility] ED 8/25/2023 with complaints of SOB. While in the ED, patient developed A-fib with RVR and was placed on Cardizem infusion. She was also treated with bronchodilators and prednisone. She was admitted to [facility] telemetry unit by Dr. Warner. Overnight, patient with desaturations in the 80s and decreased mental status. This morning, ABG revealing hypercapnia and severe mixed acidosis. She was placed on AVAPS and transferred to ICU. Unfortunately, after about 30 minutes, mental status remained very poor and patient was intubated for airway protection and respiratory failure. ICU consulted for ongoing care.     Intubated upon arrival to ICU. CVC placed for shock requiring levophed. Also initiated on Amiodarone for Afib with RVR as cardizem was stopped due to shock.

## 2023-08-27 NOTE — SUBJECTIVE & OBJECTIVE
Past Medical History:   Diagnosis Date    Coronary artery disease     GERD (gastroesophageal reflux disease)     Hyperlipidemia     Hypertension        Past Surgical History:   Procedure Laterality Date    CARDIAC CATHETERIZATION N/A      SECTION, CLASSIC      HYSTERECTOMY         Review of patient's allergies indicates:   Allergen Reactions    Shellfish containing products        No current facility-administered medications on file prior to encounter.     Current Outpatient Medications on File Prior to Encounter   Medication Sig    amLODIPine (NORVASC) 5 MG tablet Take 5 mg by mouth.    aspirin (ECOTRIN) 81 MG EC tablet aspirin    atorvastatin (LIPITOR) 20 MG tablet Take 20 mg by mouth once daily.    ergocalciferol (ERGOCALCIFEROL) 50,000 unit Cap TAKE ONE CAPSULE BY MOUTH EVERY 7 DAYS (ONCE WEEKLY)    furosemide (LASIX) 40 MG tablet Take 40 mg by mouth every 48 hours.     isosorbide mononitrate (IMDUR) 30 MG 24 hr tablet Take 30 mg by mouth once daily.    lisinopril 10 MG tablet Take 10 mg by mouth once daily.    multivit-min/ferrous fumarate (MULTI VITAMIN ORAL) Take by mouth.    pantoprazole (PROTONIX) 40 MG tablet Take 1 tablet (40 mg total) by mouth once daily.    rOPINIRole (REQUIP) 0.5 MG tablet ropinirole 0.5 mg tablet   Take 1 tablet every day by oral route at bedtime.    sertraline (ZOLOFT) 25 MG tablet sertraline 25 mg tablet   Take 1 tablet every day by oral route.    albuterol (PROVENTIL) 2.5 mg /3 mL (0.083 %) nebulizer solution 3 mLs.    aspirin (ECOTRIN) 81 MG EC tablet Take 81 mg by mouth once daily.    clopidogrel (PLAVIX) 75 mg tablet Take 75 mg by mouth once daily.    diphenoxylate-atropine 2.5-0.025 mg (LOMOTIL) 2.5-0.025 mg per tablet diphenoxylate-atropine 2.5 mg-0.025 mg tablet    gabapentin (NEURONTIN) 300 MG capsule Take 1 capsule (300 mg total) by mouth 3 (three) times daily. for 14 days (Patient taking differently: Take 100 mg by mouth 3 (three) times daily.)    nitroGLYCERIN  (NITROSTAT) 0.4 MG SL tablet nitroglycerin 0.4 mg sublingual tablet    oseltamivir (TAMIFLU) 75 MG capsule Tamiflu 75 mg capsule   Take 1 capsule every day by oral route.    traZODone (DESYREL) 50 MG tablet Take 50 mg by mouth.     Family History       Problem Relation (Age of Onset)    Heart disease Mother, Father, Paternal Grandfather, Paternal Grandmother, Maternal Grandmother, Maternal Grandfather    Hypertension Father          Tobacco Use    Smoking status: Every Day     Current packs/day: 0.80     Average packs/day: 0.8 packs/day for 50.0 years (40.0 ttl pk-yrs)     Types: Cigarettes    Smokeless tobacco: Never   Substance and Sexual Activity    Alcohol use: Yes     Comment: beer when at camp    Drug use: No    Sexual activity: Not Currently     Review of Systems   Constitutional:  Positive for activity change and chills. Negative for fever.   Respiratory:  Positive for cough (Productive) and shortness of breath.    Cardiovascular:  Negative for chest pain and palpitations.   Gastrointestinal:  Negative for abdominal pain, constipation, diarrhea, nausea and vomiting.   Neurological:  Positive for tremors, weakness and headaches. Negative for dizziness.   All other systems reviewed and are negative.    Objective:     Vital Signs (Most Recent):  Temp: 97.7 °F (36.5 °C) (08/27/23 1200)  Pulse: (!) 121 (08/27/23 1230)  Resp: (!) 24 (08/27/23 1230)  BP: (!) 107/49 (08/27/23 1230)  SpO2: (!) 72 % (unable to obtain sat) (08/27/23 1135) Vital Signs (24h Range):  Temp:  [97.5 °F (36.4 °C)-98.9 °F (37.2 °C)] 97.7 °F (36.5 °C)  Pulse:  [] 121  Resp:  [12-24] 24  SpO2:  [72 %-100 %] 72 %  BP: ()/() 107/49     Weight: 61.3 kg (135 lb 2.3 oz)  Body mass index is 23.2 kg/m².     Physical Exam  Vitals and nursing note reviewed. Exam conducted with a chaperone present.   Constitutional:       General: She is not in acute distress.     Appearance: She is ill-appearing. She is not toxic-appearing or  diaphoretic.   HENT:      Head: Normocephalic and atraumatic.      Right Ear: External ear normal.      Left Ear: External ear normal.      Nose: Nose normal.      Mouth/Throat:      Mouth: Mucous membranes are moist.   Eyes:      Pupils: Pupils are equal, round, and reactive to light.   Cardiovascular:      Rate and Rhythm: Normal rate and regular rhythm.   Pulmonary:      Effort: Pulmonary effort is normal. No accessory muscle usage or respiratory distress.      Breath sounds: Wheezing present. No rhonchi or rales.      Comments: On 2 L O2  Abdominal:      General: Bowel sounds are normal. There is no distension.      Palpations: Abdomen is soft.      Tenderness: There is no abdominal tenderness. There is no guarding or rebound.   Musculoskeletal:      Cervical back: Neck supple.      Right lower leg: No edema.      Left lower leg: No edema.   Skin:     General: Skin is warm and dry.   Neurological:      Mental Status: She is alert and oriented to person, place, and time.      Motor: Weakness present.   Psychiatric:         Mood and Affect: Affect is flat.         Speech: Speech normal.         Behavior: Behavior is cooperative.              CRANIAL NERVES     CN III, IV, VI   Pupils are equal, round, and reactive to light.       Significant Labs: All pertinent labs within the past 24 hours have been reviewed.  CBC:   Recent Labs   Lab 08/25/23  1932 08/26/23  0719 08/27/23  0314   WBC 9.64 7.96 2.66*   HGB 12.2 12.5 13.4   HCT 35.7* 35.7* 39.2    133* 98*       CMP:   Recent Labs   Lab 08/26/23  1159 08/27/23  0314 08/27/23  0934   * 125* 128*   K 4.0 3.5 3.8   CL 93* 93* 94*   CO2 21* 19* 22*   * 126* 131*   BUN 26* 38* 44*   CREATININE 1.1 1.6* 2.2*   CALCIUM 9.0 8.7 7.9*   PROT 7.0 6.6 5.5*   ALBUMIN 3.0* 2.6* 2.0*   BILITOT 1.3* 1.3* 1.0   ALKPHOS 42* 28* 21*   AST 54* 45* 44*   ALT 12 12 10   ANIONGAP 13 13 12         Significant Imaging: I have reviewed all pertinent imaging  results/findings within the past 24 hours.  Review of Systems   Constitutional: Positive for activity change and chills. Negative for fever.   Cardiovascular:  Negative for chest pain and palpitations.   Respiratory:  Positive for cough (Productive) and shortness of breath.    Gastrointestinal:  Negative for abdominal pain, constipation, diarrhea, nausea and vomiting.   Neurological:  Positive for headaches, tremors and weakness. Negative for dizziness.   All other systems reviewed and are negative.    Physical Exam  Vitals and nursing note reviewed. Exam conducted with a chaperone present.   Constitutional:       General: She is not in acute distress.     Appearance: She is ill-appearing. She is not toxic-appearing or diaphoretic.   HENT:      Head: Normocephalic and atraumatic.      Right Ear: External ear normal.      Left Ear: External ear normal.      Nose: Nose normal.      Mouth/Throat:      Mouth: Mucous membranes are moist.   Eyes:      Pupils: Pupils are equal, round, and reactive to light.   Cardiovascular:      Rate and Rhythm: Normal rate and regular rhythm.   Pulmonary:      Effort: Pulmonary effort is normal. No accessory muscle usage or respiratory distress.      Breath sounds: Wheezing present. No rhonchi or rales.      Comments: On 2 L O2  Abdominal:      General: Bowel sounds are normal. There is no distension.      Palpations: Abdomen is soft.      Tenderness: There is no abdominal tenderness. There is no guarding or rebound.   Musculoskeletal:      Cervical back: Neck supple.      Right lower leg: No edema.      Left lower leg: No edema.   Skin:     General: Skin is warm and dry.   Neurological:      Mental Status: She is alert and oriented to person, place, and time.      Motor: Weakness present.   Psychiatric:         Mood and Affect: Affect is flat.         Speech: Speech normal.         Behavior: Behavior is cooperative.

## 2023-08-27 NOTE — CONSULTS
[facility] ([facility])  Cardiology  Consult Note    Patient Name: Yelena Briceño  MRN: [id number]  Admission Date: 8/25/2023  Hospital Length of Stay: 1 days  Code Status: Full Code   Attending Provider: Artur Guillen MD   Consulting Provider: Jake Serrato Md, MD  Primary Care Physician: Prakash Jo MD  Principal Problem:Atrial fibrillation with RVR    Patient information was obtained from past medical records, ER records and primary team.     Inpatient consult to Cardiology  Consult performed by: Jake Serrato MD  Consult ordered by: Nelly Warner DO  Reason for consult: Afib, CHF        Subjective:     Chief Complaint:  SOB, weakness      HPI:   HPI:  Yelena Briceño is a 72 y.o. female with CAD, GERD, Hyperlipidemia, COPD and Hypertension who initially presented to [facility] ED on 8/25/2023 with complaints of progressive shortness of breath and weakness.  Family reports that for several days prior to ED presentation patient had been getting noticeably weaker having difficulty ambulating.  She was apparently complaining of feeling cold and unable to get warm.  Patient also reports increasing cough with greenish sputum and worsening shortness of breath.  At the time, she denied any chest pain, palpitation.  On arrival in ED, patient's heart rate was initially normal. However shortly after she was noted to have tachycardia and EKG showed new onset atrial fibrillation with RVR.  She was given a dose of Cardizem with improvement of her heart rate and converted back to sinus rhythm although her blood pressure apparently dropped transiently to 89/60.  BP normalized, however she went back into AFib RVR.  Subsequently initiated on Cardizem infusion.  Patient admitted to an episode of substernal chest pain while in ED described as pressure which has since resolved.  Additionally patient was given neb treatment x3 as well as prednisone 40 mg in the ED. Patient transferred to [facility] for further  management of new onset AFib RVR and COPD exacerbation.      Cardiology consulted for Afib, CHF, NSTEMI. Pt is in ICU intubated, sedated on mult pressors. AMIO IV bolus and drip started, s/p Dig; and with ongoing hypotension and RVR emergent DCCV x 3 done without conversion to NSR. PT remains critically ill with acidosis. ECHO with normal bi V function, mild to mod valve disease with pulm HTN.         Results for orders placed during the hospital encounter of 23    Echo    Interpretation Summary    Left Ventricle: The left ventricle is normal in size. Normal wall thickness. There is concentric remodeling. regional wall motion abnormalities present. See diagram for wall motion findings. There is normal systolic function with a visually estimated ejection fraction of 55 - 60%. Grade II diastolic dysfunction. Elevated left ventricular filling pressure.    Right Ventricle: Normal right ventricular cavity size. Wall thickness is normal. Right ventricle wall motion  is normal. Systolic function is normal.    Aortic Valve: There is mild aortic valve sclerosis.    Mitral Valve: There is bileaflet sclerosis. Moderately thickened leaflets. Moderate mitral annular calcification. There is mild to moderate regurgitation.    Tricuspid Valve: There is mild to moderate regurgitation.    Pulmonary Artery: The estimated pulmonary artery systolic pressure is 48 mmHg.    IVC/SVC: Intermediate venous pressure at 8 mmHg.        Past Medical History:   Diagnosis Date    Coronary artery disease     GERD (gastroesophageal reflux disease)     Hyperlipidemia     Hypertension        Past Surgical History:   Procedure Laterality Date    CARDIAC CATHETERIZATION N/A      SECTION, CLASSIC      HYSTERECTOMY         Review of patient's allergies indicates:   Allergen Reactions    Shellfish containing products        No current facility-administered medications on file prior to encounter.     Current Outpatient  Medications on File Prior to Encounter   Medication Sig    amLODIPine (NORVASC) 5 MG tablet Take 5 mg by mouth.    aspirin (ECOTRIN) 81 MG EC tablet aspirin    atorvastatin (LIPITOR) 20 MG tablet Take 20 mg by mouth once daily.    ergocalciferol (ERGOCALCIFEROL) 50,000 unit Cap TAKE ONE CAPSULE BY MOUTH EVERY 7 DAYS (ONCE WEEKLY)    furosemide (LASIX) 40 MG tablet Take 40 mg by mouth every 48 hours.     isosorbide mononitrate (IMDUR) 30 MG 24 hr tablet Take 30 mg by mouth once daily.    lisinopril 10 MG tablet Take 10 mg by mouth once daily.    multivit-min/ferrous fumarate (MULTI VITAMIN ORAL) Take by mouth.    pantoprazole (PROTONIX) 40 MG tablet Take 1 tablet (40 mg total) by mouth once daily.    rOPINIRole (REQUIP) 0.5 MG tablet ropinirole 0.5 mg tablet   Take 1 tablet every day by oral route at bedtime.    sertraline (ZOLOFT) 25 MG tablet sertraline 25 mg tablet   Take 1 tablet every day by oral route.    albuterol (PROVENTIL) 2.5 mg /3 mL (0.083 %) nebulizer solution 3 mLs.    aspirin (ECOTRIN) 81 MG EC tablet Take 81 mg by mouth once daily.    clopidogrel (PLAVIX) 75 mg tablet Take 75 mg by mouth once daily.    diphenoxylate-atropine 2.5-0.025 mg (LOMOTIL) 2.5-0.025 mg per tablet diphenoxylate-atropine 2.5 mg-0.025 mg tablet    gabapentin (NEURONTIN) 300 MG capsule Take 1 capsule (300 mg total) by mouth 3 (three) times daily. for 14 days (Patient taking differently: Take 100 mg by mouth 3 (three) times daily.)    nitroGLYCERIN (NITROSTAT) 0.4 MG SL tablet nitroglycerin 0.4 mg sublingual tablet    oseltamivir (TAMIFLU) 75 MG capsule Tamiflu 75 mg capsule   Take 1 capsule every day by oral route.    traZODone (DESYREL) 50 MG tablet Take 50 mg by mouth.     Family History       Problem Relation (Age of Onset)    Heart disease Mother, Father, Paternal Grandfather, Paternal Grandmother, Maternal Grandmother, Maternal Grandfather    Hypertension Father          Tobacco Use    Smoking status:  Every Day     Current packs/day: 0.80     Average packs/day: 0.8 packs/day for 50.0 years (40.0 ttl pk-yrs)     Types: Cigarettes    Smokeless tobacco: Never   Substance and Sexual Activity    Alcohol use: Yes     Comment: beer when at camp    Drug use: No    Sexual activity: Not Currently     Review of Systems   Constitutional:  Positive for activity change and chills. Negative for fever.   Respiratory:  Positive for cough (Productive) and shortness of breath.    Cardiovascular:  Negative for chest pain and palpitations.   Gastrointestinal:  Negative for abdominal pain, constipation, diarrhea, nausea and vomiting.   Neurological:  Positive for tremors, weakness and headaches. Negative for dizziness.   All other systems reviewed and are negative.    Objective:     Vital Signs (Most Recent):  Temp: 97.7 °F (36.5 °C) (08/27/23 1200)  Pulse: (!) 121 (08/27/23 1230)  Resp: (!) 24 (08/27/23 1230)  BP: (!) 107/49 (08/27/23 1230)  SpO2: (!) 72 % (unable to obtain sat) (08/27/23 1135) Vital Signs (24h Range):  Temp:  [97.5 °F (36.4 °C)-98.9 °F (37.2 °C)] 97.7 °F (36.5 °C)  Pulse:  [] 121  Resp:  [12-24] 24  SpO2:  [72 %-100 %] 72 %  BP: ()/() 107/49     Weight: 61.3 kg (135 lb 2.3 oz)  Body mass index is 23.2 kg/m².     Physical Exam  Vitals and nursing note reviewed. Exam conducted with a chaperone present.   Constitutional:       General: She is not in acute distress.     Appearance: She is ill-appearing. She is not toxic-appearing or diaphoretic.   HENT:      Head: Normocephalic and atraumatic.      Right Ear: External ear normal.      Left Ear: External ear normal.      Nose: Nose normal.      Mouth/Throat:      Mouth: Mucous membranes are moist.   Eyes:      Pupils: Pupils are equal, round, and reactive to light.   Cardiovascular:      Rate and Rhythm: Normal rate and regular rhythm.   Pulmonary:      Effort: Pulmonary effort is normal. No accessory muscle usage or respiratory distress.       Breath sounds: Wheezing present. No rhonchi or rales.      Comments: On 2 L O2  Abdominal:      General: Bowel sounds are normal. There is no distension.      Palpations: Abdomen is soft.      Tenderness: There is no abdominal tenderness. There is no guarding or rebound.   Musculoskeletal:      Cervical back: Neck supple.      Right lower leg: No edema.      Left lower leg: No edema.   Skin:     General: Skin is warm and dry.   Neurological:      Mental Status: She is alert and oriented to person, place, and time.      Motor: Weakness present.   Psychiatric:         Mood and Affect: Affect is flat.         Speech: Speech normal.         Behavior: Behavior is cooperative.              CRANIAL NERVES     CN III, IV, VI   Pupils are equal, round, and reactive to light.       Significant Labs: All pertinent labs within the past 24 hours have been reviewed.  CBC:   Recent Labs   Lab 08/25/23  1932 08/26/23  0719 08/27/23  0314   WBC 9.64 7.96 2.66*   HGB 12.2 12.5 13.4   HCT 35.7* 35.7* 39.2    133* 98*       CMP:   Recent Labs   Lab 08/26/23  1159 08/27/23  0314 08/27/23  0934   * 125* 128*   K 4.0 3.5 3.8   CL 93* 93* 94*   CO2 21* 19* 22*   * 126* 131*   BUN 26* 38* 44*   CREATININE 1.1 1.6* 2.2*   CALCIUM 9.0 8.7 7.9*   PROT 7.0 6.6 5.5*   ALBUMIN 3.0* 2.6* 2.0*   BILITOT 1.3* 1.3* 1.0   ALKPHOS 42* 28* 21*   AST 54* 45* 44*   ALT 12 12 10   ANIONGAP 13 13 12         Significant Imaging: I have reviewed all pertinent imaging results/findings within the past 24 hours.  Review of Systems   Constitutional: Positive for activity change and chills. Negative for fever.   Cardiovascular:  Negative for chest pain and palpitations.   Respiratory:  Positive for cough (Productive) and shortness of breath.    Gastrointestinal:  Negative for abdominal pain, constipation, diarrhea, nausea and vomiting.   Neurological:  Positive for headaches, tremors and weakness. Negative for dizziness.   All other systems  reviewed and are negative.    Physical Exam  Vitals and nursing note reviewed. Exam conducted with a chaperone present.   Constitutional:       General: She is not in acute distress.     Appearance: She is ill-appearing. She is not toxic-appearing or diaphoretic.   HENT:      Head: Normocephalic and atraumatic.      Right Ear: External ear normal.      Left Ear: External ear normal.      Nose: Nose normal.      Mouth/Throat:      Mouth: Mucous membranes are moist.   Eyes:      Pupils: Pupils are equal, round, and reactive to light.   Cardiovascular:      Rate and Rhythm: Normal rate and regular rhythm.   Pulmonary:      Effort: Pulmonary effort is normal. No accessory muscle usage or respiratory distress.      Breath sounds: Wheezing present. No rhonchi or rales.      Comments: On 2 L O2  Abdominal:      General: Bowel sounds are normal. There is no distension.      Palpations: Abdomen is soft.      Tenderness: There is no abdominal tenderness. There is no guarding or rebound.   Musculoskeletal:      Cervical back: Neck supple.      Right lower leg: No edema.      Left lower leg: No edema.   Skin:     General: Skin is warm and dry.   Neurological:      Mental Status: She is alert and oriented to person, place, and time.      Motor: Weakness present.   Psychiatric:         Mood and Affect: Affect is flat.         Speech: Speech normal.         Behavior: Behavior is cooperative.         Assessment and Plan:     * Atrial fibrillation with RVR  Cont amio drip, s/p Dig IV  S/p DCCV x 3 without NSR  Can discuss DCCV in 24-48 hrs if sepsis improves    Septic shock  Cont tx per primary and ICU team    Acute exacerbation of CHF (congestive heart failure)  Diuretics held sec to septic shock    Hyponatremia  Cont tx per primary and ICU team    Elevated troponin  Sec to demand ischemia in setting of shock/sepsis  Cont ACS tx and will discuss ischemic workup once stable    Acute respiratory failure with hypoxia and  hypercarbia  Cont tx per primary and ICU team    COPD exacerbation  Cont tx per primary and ICU team    Coronary atherosclerosis of unspecified type of vessel, native or graft  Restart Asa, plavix, statin, BB once tolerated     Essential hypertension, benign  On pressors, hold BP meds         Critical Care Time: 65 minutes  Critical secondary to Patient has a condition that poses threat to life and bodily function:     AFib v RVR, CHF, septic shock, CAD, COPD exac, resp failure, ALEX      Critical care was time spent personally by me on the following activities: development of treatment plan with patient or surrogate and bedside caregivers, discussions with consultants, evaluation of patient's response to treatment, examination of patient, ordering and performing treatments and interventions, ordering and review of laboratory studies, ordering and review of cardiac and radiographic studies, telemetry and re-evaluation of patient's condition. This critical care time did not overlap with that of any other provider or involve time for any procedures.      VTE Risk Mitigation (From admission, onward)         Ordered     IP VTE HIGH RISK PATIENT  Once         08/27/23 0946     Place sequential compression device  Until discontinued         08/26/23 1200     heparin 25,000 units in dextrose 5% (100 units/ml) IV bolus from bag - ADDITIONAL PRN BOLUS - 60 units/kg  As needed (PRN)        Question:  Heparin Infusion Adjustment (DO NOT MODIFY ANSWER)  Answer:  [email or web address]\Images\Pharmacy\HeparinInfusions\heparin LOW INTENSITY nomogram for [facility] [id number]    08/26/23 0641     heparin 25,000 units in dextrose 5% (100 units/ml) IV bolus from bag - ADDITIONAL PRN BOLUS - 30 units/kg  As needed (PRN)        Question:  Heparin Infusion Adjustment (DO NOT MODIFY ANSWER)  Answer:  \[email or web address]\Images\Pharmacy\HeparinInfusions\heparin LOW INTENSITY nomogram for [facility] [id number]    08/26/23 0641     heparin 25,000 units in  dextrose 5% 250 mL (100 units/mL) infusion LOW INTENSITY nomogram - [facility]  Continuous        Question Answer Comment   Heparin Infusion Adjustment (DO NOT MODIFY ANSWER) [email or web address]\Images\[email or web address]\HeparinInfusions\heparin LOW INTENSITY nomogram for [facility] [id number]    Begin at (in units/kg/hr) 12        08/26/23 0641                Thank you for your consult. I will follow-up with patient. Please contact us if you have any additional questions.    Jake Serrato Md, MD  Cardiology   [facility] ([facility])

## 2023-08-27 NOTE — PROGRESS NOTES
[facility] Medicine  Progress Note    Patient Name: Yelena Briceño  MRN: [id number]  Patient Class: OP- Observation   Admission Date: 8/25/2023  Length of Stay: 1 days  Attending Physician: Nelly Warner DO  Primary Care Provider: Prakash Jo MD        Subjective:     Principal Problem:Atrial fibrillation with RVR        HPI:  Yelena Briceño is a 72 y.o. female with CAD, GERD, Hyperlipidemia, COPD and Hypertension who initially presented to [facility] ED on 8/25/2023 with complaints of progressive shortness of breath and weakness.  Family reports that for several days prior to ED presentation patient had been getting noticeably weaker having difficulty ambulating.  She was apparently complaining of feeling cold and unable to get warm.  Patient also reports increasing cough with greenish sputum and worsening shortness of breath.  At the time, she denied any chest pain, palpitation.  On arrival in ED, patient's heart rate was initially normal. However shortly after she was noted to have tachycardia and EKG showed new onset atrial fibrillation with RVR.  She was given a dose of Cardizem with improvement of her heart rate and converted back to sinus rhythm although her blood pressure apparently dropped transiently to 89/60.  BP normalized, however she went back into AFib RVR.  Subsequently initiated on Cardizem infusion.  Patient admitted to an episode of substernal chest pain while in ED described as pressure which has since resolved.  Additionally patient was given neb treatment x3 as well as prednisone 40 mg in the ED. Patient transferred to [facility] for further management of new onset AFib RVR and COPD exacerbation.      PCP: Prakash Jo MD    SDM:  DaughterSameera    CODE STATUS:  Full code, discussed with patient and daughter      Overview/Hospital Course:  No notes on file    Interval History:  Overnight patient required increased O2 and daughter reports that  she was becoming was less alert.  Her this morning room staff was unable to obtain O2 level per pulse ox.  Stat ABG ordered.  Patient examined and found to be altered, not answering any questions or following any commands.  ABG showed new hypercapnia.  Discussed with critical care team.  Plan for transfer to ICU for further management.  Discussed plan of care with patient's daughter at bedside.    Review of Systems   Unable to perform ROS: Mental status change     Objective:     Vital Signs (Most Recent):  Temp: 97.5 °F (36.4 °C) (08/27/23 0407)  Pulse: 101 (08/27/23 0803)  Resp: (!) 24 (08/27/23 0803)  BP: (!) 104/57 (08/27/23 0407)  SpO2: (!) 89 % (08/27/23 0803) Vital Signs (24h Range):  Temp:  [97.5 °F (36.4 °C)-98.9 °F (37.2 °C)] 97.5 °F (36.4 °C)  Pulse:  [] 101  Resp:  [16-25] 24  SpO2:  [82 %-93 %] 89 %  BP: (104-138)/(55-61) 104/57     Weight: 61.7 kg (136 lb)  Body mass index is 23.34 kg/m².    Intake/Output Summary (Last 24 hours) at 8/27/2023 0811  Last data filed at 8/26/2023 1733  Gross per 24 hour   Intake 110.79 ml   Output --   Net 110.79 ml         Physical Exam  Vitals and nursing note reviewed. Exam conducted with a chaperone present.   HENT:      Head: Normocephalic and atraumatic.      Right Ear: External ear normal.      Left Ear: External ear normal.   Eyes:      Pupils: Pupils are equal, round, and reactive to light.   Cardiovascular:      Rate and Rhythm: Normal rate. Rhythm irregular.   Pulmonary:      Effort: Pulmonary effort is normal. No accessory muscle usage or respiratory distress.      Breath sounds: Rhonchi and rales present. No wheezing.   Abdominal:      General: Bowel sounds are normal. There is no distension.      Palpations: Abdomen is soft.      Tenderness: There is no abdominal tenderness. There is no guarding or rebound.   Musculoskeletal:      Cervical back: Neck supple.      Right lower leg: No edema.      Left lower leg: No edema.   Skin:     General: Skin is warm  and dry.   Neurological:      Mental Status: She is alert.             Significant Labs: All pertinent labs within the past 24 hours have been reviewed.  CBC:   Recent Labs   Lab 08/25/23  1932 08/26/23  0719 08/27/23  0314   WBC 9.64 7.96 2.66*   HGB 12.2 12.5 13.4   HCT 35.7* 35.7* 39.2    133* 98*     CMP:   Recent Labs   Lab 08/25/23  1932 08/26/23  1159 08/27/23  0314   * 127* 125*   K 3.4* 4.0 3.5   CL 94* 93* 93*   CO2 20* 21* 19*   * 131* 126*   BUN 18 26* 38*   CREATININE 1.1 1.1 1.6*   CALCIUM 8.7 9.0 8.7   PROT 7.0 7.0 6.6   ALBUMIN 3.1* 3.0* 2.6*   BILITOT 1.3* 1.3* 1.3*   ALKPHOS 53* 42* 28*   AST 38 54* 45*   ALT 10 12 12   ANIONGAP 14 13 13         Significant Imaging: I have reviewed all pertinent imaging results/findings within the past 24 hours.    Assessment/Plan:      * Atrial fibrillation with RVR  Patient with Paroxysmal (<7 days) atrial fibrillation which is controlled currently with Calcium Channel Blocker. Patient is currently in atrial fibrillation.[id number] Score: 3. HASBLED Score: . Anticoagulation indicated. Anticoagulation done with Heparin infusion.    New onset AFib RVR noted when patient was in [facility] ED. temporarily converted to sinus rhythm after bolus of Cardizem, however went back to AFib RVR shortly after and started on Cardizem and heparin infusion  EKG obtained after arrival at [facility] showed undetermined rhythm  Cardiology consulted   Echo shows normal EF with grade 2 diastolic dysfunction  Continue cardiac monitoring    Acute exacerbation of CHF (congestive heart failure)  Patient is identified as having Diastolic (HFpEF) heart failure that is Acute. CHF is currently uncontrolled due to Rales/crackles on pulmonary exam. Latest ECHO performed and demonstrates- Results for orders placed during the hospital encounter of 08/25/23    Echo    Interpretation Summary    Left Ventricle: The left ventricle is normal in size. Normal wall thickness. There is  concentric remodeling. regional wall motion abnormalities present. See diagram for wall motion findings. There is normal systolic function with a visually estimated ejection fraction of 55 - 60%. Grade II diastolic dysfunction. Elevated left ventricular filling pressure.    Right Ventricle: Normal right ventricular cavity size. Wall thickness is normal. Right ventricle wall motion  is normal. Systolic function is normal.    Aortic Valve: There is mild aortic valve sclerosis.    Mitral Valve: There is bileaflet sclerosis. Moderately thickened leaflets. Moderate mitral annular calcification. There is mild to moderate regurgitation.    Tricuspid Valve: There is mild to moderate regurgitation.    Pulmonary Artery: The estimated pulmonary artery systolic pressure is 48 mmHg.    IVC/SVC: Intermediate venous pressure at 8 mmHg.  . Continue Furosemide and monitor clinical status closely. Monitor on telemetry. Patient is on CHF pathway.  Monitor strict Is&Os and daily weights.  Place on fluid restriction of 1.5 L. Cardiology has been consulted. Continue to stress to patient importance of self efficacy and  on diet for CHF. Last BNP reviewed- and noted below   Recent Labs   Lab 08/25/23  1932   *   .    Hyponatremia  Presented with Na 128 in ED  Trending down  Urine studies  Likely related to volume overload    Elevated troponin  Patient had 1 episode of chest pain in ED which has since resolved without any nitro or pain medication  Possibly demand ischemia in setting of AFib RVR and COPD exacerbation  Troponin trended up further  Cardiology consulted        Acute respiratory failure with hypoxemia  Patient with Hypoxic Respiratory failure which is Acute.  she is not on home oxygen. Supplemental oxygen was provided and noted- Oxygen Concentration (%):  [] 100    .   Signs/symptoms of respiratory failure include- increased work of breathing and wheezing. Contributing diagnoses includes - CHF and COPD Labs  and images were reviewed. Patient Has recent ABG, which has been reviewed. Will treat underlying causes and adjust management of respiratory failure as follows-     Treat COPD exacerbation and CHF  Repeat ABG shows hypercapnia with pCO2 of 67.8, significantly increased from pCO2 of 33.3 yesterday  Started on BiPAP  Onto transferred to ICU for further management    COPD exacerbation  IV Solu-Medrol  Scheduled nebs  Supplemental O2  Monitor respiratory status      Nicotine dependence, cigarettes, uncomplicated  Patient admits that she started smoking at the age of 14 and currently smokes approximately half pack of cigarettes daily.  States that she has tried to quit smoking before however has been unsuccessful    Dangers of cigarette smoking were reviewed with patient in detail. Patient was Counseled for 3-10 minutes. Nicotine replacement options were discussed. Nicotine replacement was discussed- prescribed    Coronary atherosclerosis of unspecified type of vessel, native or graft  Follows with Dr. Nair outpatient for Cardiology  Continue home aspirin, Plavix  Cardiology consulted      Essential hypertension, benign  Currently normotensive   As patient is on Cardizem infusion at this time we will hold off on resuming home antihypertensive meds  Monitor BP        VTE Risk Mitigation (From admission, onward)           Ordered     IP VTE HIGH RISK PATIENT  Once         08/27/23 0819     Place sequential compression device  Until discontinued         08/26/23 1200     heparin 25,000 units in dextrose 5% (100 units/ml) IV bolus from bag - ADDITIONAL PRN BOLUS - 60 units/kg  As needed (PRN)        Question:  Heparin Infusion Adjustment (DO NOT MODIFY ANSWER)  Answer:  [email or web address]\epic\Images\Pharmacy\HeparinInfusions\heparin LOW INTENSITY nomogram for [facility] [id number]    08/26/23 0641     heparin 25,000 units in dextrose 5% (100 units/ml) IV bolus from bag - ADDITIONAL PRN BOLUS - 30 units/kg  As needed (PRN)         Question:  Heparin Infusion Adjustment (DO NOT MODIFY ANSWER)  Answer:  [email or web address]\epic\Images\Pharmacy\HeparinInfusions\heparin LOW INTENSITY nomogram for [facility] [id number]    08/26/23 0641     heparin 25,000 units in dextrose 5% 250 mL (100 units/mL) infusion LOW INTENSITY nomogram - [facility]  Continuous        Question Answer Comment   Heparin Infusion Adjustment (DO NOT MODIFY ANSWER) [email or web address]\epic\Images\Pharmacy\HeparinInfusions\heparin LOW INTENSITY nomogram for [facility] [id number]    Begin at (in units/kg/hr) 12        08/26/23 0641                    Discharge Planning   LOLLY:      Code Status: Full Code   Is the patient medically ready for discharge?:     Reason for patient still in hospital (select all that apply): Patient unstable, Patient trending condition, Treatment and Consult recommendations           Critical care time spent on the evaluation and treatment of severe organ dysfunction, review of pertinent labs and imaging studies, discussions with consulting providers and discussions with patient/family: 60 minutes.            Nelly Warner DO  Department of Hospital Medicine   [facility] - Telemetry ([facility])

## 2023-08-27 NOTE — ASSESSMENT & PLAN NOTE
Patient with Hypercapnic and Hypoxic Respiratory failure which is Acute.  she is not on home oxygen. Supplemental oxygen was provided and noted- Vent Mode: A/C  Oxygen Concentration (%):  [] 100  Resp Rate Total:  [24 br/min] 24 br/min  Vt Set:  [350 mL] 350 mL  PEEP/CPAP:  [5 cmH20] 5 cmH20  Mean Airway Pressure:  [11 cmH20] 11 cmH20  Signs/symptoms of respiratory failure include- tachypnea, wheezing, lethargy and cyanosis. Contributing diagnoses includes - CHF, COPD and sepsisLabs and images were reviewed. Patient Has recent ABG, which has been reviewed.   Will treat underlying causes and adjust management of respiratory failure as follows-   Initially requiring supplemental oxygen and was treated for COPD exacerbation with steroids and bronchodilators   Intubated 8/27 with hypercapnia + encephalopathy  CXR with worsening bilateral infiltrates, this is associated with neutropenia + lactic acidosis + shock + elevated procalcitonin   Ventilator settings adjusted, follow up ABG pending   Sputum culture and MRSA PCR pending   Zosyn and Zyvox ordered   Fentanyl and Precedex for sedation, RASS goal -1  ABG and CXR as needed   Evaluate daily for appropriateness of SAT/SBT

## 2023-08-27 NOTE — ASSESSMENT & PLAN NOTE
Suspected source: suspected pneumonia   IVF resuscitation: did not received 30 mL/kg with CHF history: has received 1L NS   LA initially 3.1, repeat is pending  Procalcitonin elevated renal dysfunction could be contributing + neutropenia   Hypotensive despite fluids with evidence of hypoperfusion   CVC placed 8/27 and initiated on levophed to keep MAP > 65 mmHg   Zosyn and Zyvox  Sputum, blood and urinalysis pending   MRSA PCR pending   CT head/chest/abd/pelvis is pending

## 2023-08-27 NOTE — ASSESSMENT & PLAN NOTE
Sec to demand ischemia in setting of shock/sepsis  Cont ACS tx and will discuss ischemic workup once stable

## 2023-08-27 NOTE — PROCEDURES
"Yelena Briceño is a 72 y.o. female patient.    Temp: 97.5 °F (36.4 °C) (23 0407)  Pulse: 92 (23)  Resp: (!) 24 (23 09)  BP: (!) 153/93 (23 0858)  SpO2: (!) 94 % (23)  Weight: 61.3 kg (135 lb 3.2 oz) (23)  Height: 5' 4" (162.6 cm) (23)       Intubation    Date/Time: 2023 8:55 AM  Location procedure was performed: [facility] INTENSIVE CARE UNIT    Performed by: Reema Jasso NP  Authorized by: Artur Guillen MD  Consent Done: Yes  Consent: Verbal consent obtained.  Risks and benefits: risks, benefits and alternatives were discussed  Consent given by: miya- Sameera.  Required items: required blood products, implants, devices, and special equipment available  Patient identity confirmed:  and name  Indications: respiratory failure, hypoxemia, hypercapnia and airway protection  Intubation method: video-assisted  Patient status: paralyzed (RSI)  Sedatives: etomidate  Paralytic: rocuronium  Laryngoscope size: Mac 4  Tube size: 7.5 mm  Tube type: cuffed  Number of attempts: 2  Ventilation between attempts: BVM  Cords visualized: yes  Post-procedure assessment: chest rise and CO2 detector  Breath sounds: absent over the epigastrium, rales/crackles and equal  Cuff inflated: yes  ETT to teeth: 22 cm  Tube secured with: ETT warner  Chest x-ray interpreted by me and radiologist.  Chest x-ray findings: endotracheal tube too low  Tube repositioned: tube repositioned successfully  Patient tolerance: Patient tolerated the procedure well with no immediate complications  Comments: Direct attempted first with visualization of epiglottis but unable to visualize cords even with cricoid pressure. BVM ventilation initiated while preparing for video-assisted intubation. SPO2 96% during BVM. Intubated using video assist without immediate issue. Follow up CXR showing tube is a little low- retracted 1cm to 21 cm at the teeth.           2023    "

## 2023-08-27 NOTE — CONSULTS
[facility] - Intensive Care ([facility])  Adult Nutrition  Consult Note    SUMMARY     Recommendations  1. Recommend pt be initiated onto EN when medically appropriate.   - Nova source Renal. Initiate at 10 mL/hr, advance as pt tolerates. Aim for goal rate of 25 mL/hr.   - Formula will provide: 1200 kcal (91% EEN), 54.42g Protein (100% EPN), 430.2 mL/day.   - 145 mL fwf q4hrs (870 mL) or per MD/NP.   - Free water + FWF = 1300.2 mL/day (99% EFN).   - Check Mg, Na, K+, Phos, and Glu before and during initiation, correct as indicated.   2. Weigh daily.    Goals:   1. Pt will be initiated onto EN within 24 hrs.   2. Pt will consume >75% of EEN and EPN prior to RD follow up.  Nutrition Goal Status: new  Communication of RD Recs: other (comment) (POC: Sticky Note)    Assessment and Plan      Nutrition Problem  Inadequate PO intake    Related to (etiology):   Decreased ability to consume sufficient nutrition    Signs and Symptoms (as evidenced by):   NPO, Vent    Interventions(treatment strategy):  1. Enteral Nutrition  2. Collaboration of care with medical providers    Nutrition Diagnosis Status:   New       Malnutrition Assessment     Skin (Micronutrient):  (Juancho = 19)                                 Reason for Assessment    Reason For Assessment: consult, new tube feeding  Diagnosis: cardiac disease, pulmonary disease (Atrial fibrillation with RVR, Acute respiratory failure with hypoxemia)  Relevant Medical History: Nicotine dependence, Atrial fibrillation with RVR, HTN, CAD, COPD, CHF, PAD, Dyspnea and respiratory abnormalities (Chronic)  Interdisciplinary Rounds: did not attend  General Information Comments:   8/27: 73 y/o female admitted w/ active principal problem of Atrial fibrillation with RVR. Pt had significant overnight requiring increased O2 and daughter reports that she was becoming was less alert.  Her this morning room staff was unable to obtain O2 level per pulse ox.  Stat ABG ordered.  Patient examined and  "found to be altered, not answering any questions or following any commands.  ABG showed new hypercapnia.  Discussed with critical care team.  Plan for transfer to ICU for further management. Pt currently intubated (Total Ve: 8.26 L/M). NFPE not performed per covering pt remotely. RD to assess need for NFPE during RD follow up. The pt is currently charted to weigh 135 lbs 3.2 oz, BMI 23.21 (Normal). Pt LBM: 8/25. RD will continue to monitor.  Nutrition Discharge Planning: Cardiac, 2 gm    Wt Readings from Last 20 Encounters:   08/27/23 61.3 kg (135 lb 2.3 oz)   06/06/23 64 kg (141 lb)   05/09/23 64 kg (141 lb)   05/01/23 64 kg (141 lb)   04/29/23 64.4 kg (141 lb 13.9 oz)   12/19/22 63.6 kg (140 lb 3.4 oz)   11/07/22 63.6 kg (140 lb 3.4 oz)   10/14/22 65.8 kg (145 lb)   11/16/20 71.6 kg (157 lb 13.6 oz)   12/09/17 74.4 kg (164 lb)   06/18/14 77.6 kg (171 lb 2 oz)   06/10/14 77.2 kg (170 lb 4.8 oz)   08/15/13 80 kg (176 lb 6 oz)   ]    Nutrition Risk Screen    Nutrition Risk Screen:  (COPD exacerbation.)    Nutrition/Diet History    Spiritual, Cultural Beliefs, Congregation Practices, Values that Affect Care:  (Unable to assess, covering remotely)  Food Allergies: shellfish  Factors Affecting Nutritional Intake: impaired cognitive status/motor control, NPO, on mechanical ventilation    Anthropometrics    Temp: 97.5 °F (36.4 °C)  Height Method: Estimated  Height: 5' 4" (162.6 cm)  Height (inches): 64 in  Weight Method: Bed Scale  Weight: 61.3 kg (135 lb 2.3 oz)  Weight (lb): 135.14 lb  Ideal Body Weight (IBW), Female: 120 lb  % Ideal Body Weight, Female (lb): 112.62 %  BMI (Calculated): 23.2  BMI Grade: 18.5-24.9 - normal       Lab/Procedures/Meds  BMP  Lab Results   Component Value Date     (L) 08/27/2023    K 3.8 08/27/2023    CL 94 (L) 08/27/2023    CO2 22 (L) 08/27/2023    BUN 44 (H) 08/27/2023    CREATININE 2.2 (H) 08/27/2023    CALCIUM 7.9 (L) 08/27/2023    ANIONGAP 12 08/27/2023    EGFRNORACEVR 23 (A) " 08/27/2023     Lab Results   Component Value Date    CALCIUM 7.9 (L) 08/27/2023    PHOS 6.7 (H) 08/27/2023     Recent Labs   Lab 08/27/23  0935   POCTGLUCOSE 140*       Pertinent Labs Reviewed: reviewed  Pertinent Medications Reviewed: reviewed  Scheduled Meds:   budesonide  0.5 mg Nebulization Q12H    chlorhexidine  15 mL Mouth/Throat BID    levalbuterol  1.2495 mg Nebulization Q6H    And    ipratropium  0.5 mg Nebulization Q6H    linezolid  600 mg Intravenous Q12H    methylPREDNISolone sodium succinate injection  80 mg Intravenous Q12H    mupirocin   Nasal BID    nicotine  1 patch Transdermal Daily    pantoprazole  40 mg Intravenous Daily    piperacillin-tazobactam (Zosyn) IV (PEDS and ADULTS) (extended infusion is not appropriate)  4.5 g Intravenous Q8H    sodium chloride 0.9%  500 mL Intravenous Once     Continuous Infusions:   amiodarone in dextrose 5% 1 mg/min (08/27/23 1017)    amiodarone in dextrose 5%      dexmedeTOMIDine (Precedex) infusion (titrating) 0.2 mcg/kg/hr (08/27/23 0958)    fentanyl 25 mcg/hr (08/27/23 1032)    heparin (porcine) in D5W 12 Units/kg/hr (08/27/23 1044)    NORepinephrine bitartrate-D5W 0.12 mcg/kg/min (08/27/23 0950)     PRN Meds:.acetaminophen, albuterol sulfate, aluminum-magnesium hydroxide-simethicone, bisacodyL, heparin (PORCINE), heparin (PORCINE), HYDROcodone-acetaminophen, magnesium oxide, magnesium oxide, melatonin, naloxone, ondansetron, ondansetron, simethicone, sodium chloride 0.9%, sodium chloride 0.9%      Estimated/Assessed Needs    Weight Used For Calorie Calculations: 61.3 kg (135 lb 2.3 oz)  Energy Calorie Requirements (kcal): 1315 (Intubated; Total Ve: 8.26 L/M)  Energy Need Method: [facility]  Protein Requirements: 49-61 (0.8-1.0 g/kg per possible ALEX)  Weight Used For Protein Calculations: 61.3 kg (135 lb 2.3 oz)  Fluid Requirements (mL): 500 + total output     RDA Method (mL): 1315  CHO Requirement: 164      Nutrition Prescription Ordered    Current Diet Order:  NPO    Evaluation of Received Nutrient/Fluid Intake    I/O: +110.8 Since Admit  Energy Calories Required: not meeting needs  Protein Required: not meeting needs  Fluid Required: not meeting needs  Tolerance: not tolerating  % Intake of Estimated Energy Needs: 0 - 25 %  % Meal Intake: NPO    Nutrition Risk    Level of Risk/Frequency of Follow-up: high (x2 weekly)       Monitor and Evaluation    Food and Nutrient Intake: energy intake, enteral nutrition intake  Food and Nutrient Adminstration: enteral and parenteral nutrition administration  Physical Activity and Function: nutrition-related ADLs and IADLs  Anthropometric Measurements: weight, body mass index  Biochemical Data, Medical Tests and Procedures: electrolyte and renal panel, glucose/endocrine profile, inflammatory profile  Nutrition-Focused Physical Findings: overall appearance, extremities, muscles and bones, head and eyes, skin       Nutrition Follow-Up    RD Follow-up?: Yes  Jesus Kwon, Registration Eligible, Provisional LDN

## 2023-08-27 NOTE — ASSESSMENT & PLAN NOTE
Patient is identified as having Diastolic (HFpEF) heart failure that is Acute. CHF is currently uncontrolled due to Rales/crackles on pulmonary exam. Latest ECHO performed and demonstrates- Results for orders placed during the hospital encounter of 08/25/23    Echo    Interpretation Summary    Left Ventricle: The left ventricle is normal in size. Normal wall thickness. There is concentric remodeling. regional wall motion abnormalities present. See diagram for wall motion findings. There is normal systolic function with a visually estimated ejection fraction of 55 - 60%. Grade II diastolic dysfunction. Elevated left ventricular filling pressure.    Right Ventricle: Normal right ventricular cavity size. Wall thickness is normal. Right ventricle wall motion  is normal. Systolic function is normal.    Aortic Valve: There is mild aortic valve sclerosis.    Mitral Valve: There is bileaflet sclerosis. Moderately thickened leaflets. Moderate mitral annular calcification. There is mild to moderate regurgitation.    Tricuspid Valve: There is mild to moderate regurgitation.    Pulmonary Artery: The estimated pulmonary artery systolic pressure is 48 mmHg.    IVC/SVC: Intermediate venous pressure at 8 mmHg.  . Continue Furosemide and monitor clinical status closely. Monitor on telemetry. Patient is on CHF pathway.  Monitor strict Is&Os and daily weights.  Place on fluid restriction of 1.5 L. Cardiology has been consulted. Continue to stress to patient importance of self efficacy and  on diet for CHF. Last BNP reviewed- and noted below   Recent Labs   Lab 08/25/23  1932   *   .

## 2023-08-27 NOTE — NURSING
PRUDENCE called- see flowsheet for ref number.  Followed up with  regarding  home name.  Contacted [facility] ( home chosen by family). ETA to  body 18:30.

## 2023-08-27 NOTE — ASSESSMENT & PLAN NOTE
Patient with Paroxysmal (<7 days) atrial fibrillation which is controlled currently with Calcium Channel Blocker. Patient is currently in atrial fibrillation.[id number] Score: 3. HASBLED Score: . Anticoagulation indicated. Anticoagulation done with Heparin infusion.    New onset AFib RVR noted when patient was in [facility] ED. temporarily converted to sinus rhythm after bolus of Cardizem, however went back to AFib RVR shortly after and started on Cardizem and heparin infusion  [place] [facility] [place] undetermined rhythm  Cardiology consulted   Echo shows normal EF with grade 2 diastolic dysfunction  Continue cardiac monitoring

## 2023-08-27 NOTE — ASSESSMENT & PLAN NOTE
Echo 8/26 with EF 55-60% with LV grade 2 diastolic dysfunction, normal RV function, pulm HTN  Despite elevated BNP on admission, appears to be on the dry side and has received some fluids  Strict I/O  Daily weights   Cardiology following

## 2023-08-27 NOTE — DISCHARGE SUMMARY
[facility] ([facility])  Critical Care Medicine  Discharge Summary      Patient Name: Yelena Briceño  MRN: [id number]  Admission Date: 8/25/2023  Hospital Length of Stay: 1 days  Discharge Date and Time:  08/27/2023 5:28 PM  Attending Physician: Artur Guillen MD   Discharging Provider: Reema Jasso NP  Primary Care Provider: Prakash Jo MD  Reason for Admission: new onset Afib and COPD exacerbation     HPI:   Information obtained from chart review, daughter Heaven at bedside, and hospital medicine provider Dr. Warner.     72-year-old female with a known past medical history of COPD, tobacco abuse (current smoker > 40 years), CAD, GERD, HTN, cecum cancer s/p right hemicolectomy (2018), MDD, and dementia (daughter reports she can complete ADLs independently, memory issues and confusion at times) who presented to [facility] ED 8/25/2023 with complaints of SOB. While in the ED, patient developed A-fib with RVR and was placed on Cardizem infusion. She was also treated with bronchodilators and prednisone. She was admitted to [facility] telemetry unit by Dr. Warner. Overnight, patient with desaturations in the 80s and decreased mental status. This morning, ABG revealing hypercapnia and severe mixed acidosis. She was placed on AVAPS and transferred to ICU. Unfortunately, after about 30 minutes, mental status remained very poor and patient was intubated for airway protection and respiratory failure. ICU consulted for ongoing care.     Intubated upon arrival to ICU. CVC placed for shock requiring levophed. Also initiated on Amiodarone for Afib with RVR as cardizem was stopped due to shock.       * No surgery found *    Indwelling Lines/Drains at Time of Discharge:   Lines/Drains/Airways     Central Venous Catheter Line  Duration           Percutaneous Central Line Insertion/Assessment - Triple Lumen  08/27/23 0930 Internal Jugular Right <1 day          Drain  Duration                Urethral Catheter  23 0915 Latex 16 Fr. <1 day          Airway  Duration                Airway - Non-Surgical 23 0857 Endotracheal Tube <1 day          Arterial Line  Duration           Arterial Line 23 1240 Right Radial <1 day              Hospital Course:   : transferred to ICU for hypercapnic respiratory failure, severe hypoxemia, and encephalopathy. Intubated and CVC placed for levophed upon arrival. Initiated on broad spectrum antibiotics and given additional IVFs with work up in progress. CT chest with bilateral patchy opacities- infection vs septic emobli is most concerning. CT abd/pelvis negative. Throughout the course of the day, patient with persistent hypercapnia and acidosis despite multiple vent changes. Also required up titrating of levophed and addition of levophed. Developed renal failure with minimal urine output throughout the day. Afib with RVR persisted despite multiple medications and cardioversion attempt x 3 by Cardiology. Discussed concern for ongoing decline and poor prognosis. Family decided to change patient's code status to DNR with plans to transition to comfort care once all family en route have arrived. Despite best medical care available, Ms. Briceño  with family at bedside 2023 1658. Cause of death acute hypercapnic and hypoxic respiratory failure.       Consults (From admission, onward)        Status Ordering Provider     Inpatient consult to Registered Dietitian/Nutritionist  Once        Provider:  (Not yet assigned)    REEMA Jordan     Inpatient consult to Cardiology  Once        Provider:  Jake Serrato MD    Completed JOHN PAYNE        Significant Labs:  All pertinent labs within the past 24 hours have been reviewed.    Significant Imaging:  I have reviewed all pertinent imaging results/findings within the past 24 hours.    Discharged Condition:     Disposition:  home     Medications:  None     Reema Jasso NP  Critical Care  Medicine  [facility] ([facility])

## 2023-08-27 NOTE — ASSESSMENT & PLAN NOTE
In setting of new onset Afib and now respiratory failure and shock   Suspect demand ischemia from critical illness and arrhythmia   Cardiology following

## 2023-08-27 NOTE — HPI
HPI:  Yelena Briceño is a 72 y.o. female with CAD, GERD, Hyperlipidemia, COPD and Hypertension who initially presented to [facility] ED on 8/25/2023 with complaints of progressive shortness of breath and weakness.  Family reports that for several days prior to ED presentation patient had been getting noticeably weaker having difficulty ambulating.  She was apparently complaining of feeling cold and unable to get warm.  Patient also reports increasing cough with greenish sputum and worsening shortness of breath.  At the time, she denied any chest pain, palpitation.  On arrival in ED, patient's heart rate was initially normal. However shortly after she was noted to have tachycardia and EKG showed new onset atrial fibrillation with RVR.  She was given a dose of Cardizem with improvement of her heart rate and converted back to sinus rhythm although her blood pressure apparently dropped transiently to 89/60.  BP normalized, however she went back into AFib RVR.  Subsequently initiated on Cardizem infusion.  Patient admitted to an episode of substernal chest pain while in ED described as pressure which has since resolved.  Additionally patient was given neb treatment x3 as well as prednisone 40 mg in the ED. Patient transferred to [facility] for further management of new onset AFib RVR and COPD exacerbation.      Cardiology consulted for Afib, CHF, NSTEMI. Pt is in ICU intubated, sedated on mult pressors. AMIO IV bolus and drip started, s/p Dig; and with ongoing hypotension and RVR emergent DCCV x 3 done without conversion to NSR. PT remains critically ill with acidosis. ECHO with normal bi V function, mild to mod valve disease with pulm HTN.         Results for orders placed during the hospital encounter of 08/25/23    Echo    Interpretation Summary    Left Ventricle: The left ventricle is normal in size. Normal wall thickness. There is concentric remodeling. regional wall motion abnormalities present. See diagram for wall  motion findings. There is normal systolic function with a visually estimated ejection fraction of 55 - 60%. Grade II diastolic dysfunction. Elevated left ventricular filling pressure.    Right Ventricle: Normal right ventricular cavity size. Wall thickness is normal. Right ventricle wall motion  is normal. Systolic function is normal.    Aortic Valve: There is mild aortic valve sclerosis.    Mitral Valve: There is bileaflet sclerosis. Moderately thickened leaflets. Moderate mitral annular calcification. There is mild to moderate regurgitation.    Tricuspid Valve: There is mild to moderate regurgitation.    Pulmonary Artery: The estimated pulmonary artery systolic pressure is 48 mmHg.    IVC/SVC: Intermediate venous pressure at 8 mmHg.

## 2023-08-28 NOTE — NURSING
During report the patient's daughter stated the pt's O2 was increased  from 2L to 5 liters by the resp therapist last night. Pt was found with eyes opened and appeared to be awake. She responded to my voice, but language was incomprehensible. The off going nurse and I assessed the pt's vital signs and found the pt's O2 was decreased . Respiratory and the provider was notified of a change in the patient's condition proceeding orders were carried out,and the Pt was transferred to ICU-9.

## 2023-08-29 LAB — MRSA SPEC QL CULT: NORMAL

## 2023-08-30 LAB
BACTERIA BLD CULT: ABNORMAL

## 2023-09-21 NOTE — PHYSICIAN QUERY
PT Name: Yelena Briceño  MR #: [id number]     DOCUMENTATION CLARIFICATION      CDS/: VIKASH Severino, RN, CCDS              Contact information: [email or web address]  This form is a permanent document in the medical record.    Query Date: 2023    By submitting this query, we are merely seeking further clarification of documentation to reflect the severity of illness of your patient. Please utilize your independent clinical judgment when addressing the question(s) below.     The Medical Record contains the following:   Indicators   Supporting Clinical Findings Location in Medical Record   X Chest Pain, Angina Patient admitted to an episode of substernal chest pain while in ED described as pressure which has since resolved   Cards: Dr. Serrato    X Coronary Artery Disease PMH: CAD   Cards: Dr. eSrrato    X EKG Vent. Rate : 117 BPM     Atrial Rate : 117 BPM      P-R Int : 000 ms          QRS Dur : 086 ms       QT Int : 322 ms       P-R-T Axes : 000 056 086 degrees      QTc Int : 449 ms     Atrial fibrillation with rapid ventricular response   Septal infarct (cited on or before 25-AUG-2023)   Marked ST abnormality, possible inferior subendocardial injury   Abnormal ECG    EK/25   X Troponin 0.105, 0.189, 0.484, 0.638   Lab: , , ,    X Echo Results There is normal systolic function with a visually estimated ejection fraction of 55 - 60%. Grade II diastolic dysfunction   Echo:     Angiography     X Documentation of acute cardiac condition Cardiology consulted for Afib, CHF, NSTEMI    Elevated troponin  Sec to demand ischemia in setting of shock/sepsis Cards: Dr. Serrato     Medication/Treatment      Other        Provider, please clarify conflicting cardiac diagnosis related to the above documentation:  [  X ] NSTEMI   [   ] Demand Ischemia   [   ] Demand Ischemia without Acute Myocardial Infarction   [   ] Elevated troponin only (without corresponding diagnosis)   [   ]  Other Cardiac Diagnosis (please specify): _______________       Please document in your progress notes daily for the duration of treatment until resolved, and include in your discharge summary.    Form No. [id number]

## 2023-09-21 NOTE — PHYSICIAN QUERY
PT Name: Yelena Briceño  MR #: [id number]     DOCUMENTATION CLARIFICATION     CDS/: VIKASH Severino, RN, CCDS               Contact information: [email or web address]  This form is a permanent document in the medical record.     Query Date: September 21, 2023    By submitting this query, we are merely seeking further clarification of documentation.  Please utilize your independent clinical judgment when addressing the question(s) below.    The Medical Record contains the following   Indicators Supporting Clinical Findings Location in Medical Record   X Heart Failure documented Acute exacerbation of CHF (congestive heart failure)  Patient is identified as having Diastolic (HFpEF) heart failure that is Acute    Acute exacerbation of CHF (congestive heart failure)  Diuretics held sec to septic shock   HM: Dr. Warner 8/27        Cards: Dr. Serrato 8/27   X    Lab: 8/25   X EF/Echo There is normal systolic function with a visually estimated ejection fraction of 55 - 60%. Grade II diastolic dysfunction   Echo: 8/26   X Radiology findings Bilateral pulmonary opacities are unchanged.  The lungs are hyperinflated.  Small bilateral pleural effusions   CXR: 8/27   X Subjective/Objective Respiratory Conditions complaints of progressive shortness of breath and weakness    uncontrolled due to Rales/crackles on pulmonary exam H & P: Dr. Warner 8/26      HM: Dr. Warner 8/27      Recent/Current MI      Heart Transplant, LVAD      Edema, JVD      Ascites     x Diuretics/Meds furosemide injection 40 mg  Dose: 40 mg  Freq: Once Route: IV  Start: 08/27/23 0900 End: 08/27/23 0940   MAR    Other Treatment      Other       Heart failure is a clinical diagnosis which includes symptomatic fluid retention, elevated intracardiac pressures, and/or the inability of the heart to deliver adequate blood flow.    Heart Failure with reduced Ejection Fraction (HFrEF) or Systolic Heart Failure (loses ability to contract normally, EF is  <40%)    Heart Failure with preserved Ejection Fraction (HFpEF) or Diastolic Heart Failure (stiff ventricles, does not relax properly, EF is >50%)     Heart Failure with Combined Systolic and Diastolic Failure (stiff ventricles, does not relax properly and EF is <50%)    Mid-range or mildly reduced ejection fraction (HFmrEF) is classified as systolic heart failure.  Congestive heart failure with a recovered EF is classified as Diastolic Heart Failure.  Common clues to acute exacerbation:  Rapidly progressive symptoms (w/in 2 weeks of presentation), using IV diuretics, using supplemental O2, pulmonary edema on Xray, new or worsening pleural effusion, +JVD or other signs of volume overload, MI w/in 4 weeks, and/or BNP >500  The clinical guidelines noted are only system guidelines, and do not replace the providers clinical judgment.    Provider, please clarify CHF diagnosis associated with the above clinical findings.    [   ]  Acute Diastolic Heart Failure (HFpEF) - new diagnosis   [  X ]  Acute on Chronic Diastolic Heart Failure (HFpEF) - worsening of CHF signs/symptoms in preexisting CHF   [   ]  Other (please specify): ___________________________________       Please document in your progress notes daily for the duration of treatment until resolved and include in your discharge summary.    References:  American Heart Association editorial staff. (2017, May). Ejection Fraction Heart Failure Measurement. American Heart Association. https://www.heart.org/en/health-topics/heart-failure/diagnosing-heart-failure/ejection-fraction-heart-failure-measurement#:~:text=Ejection%20fraction%20(EF)%20is%20a,pushed%20out%20with%20each%20heartbeat  JEEVAN Freeman (2020, December 15). Heart failure with preserved ejection fraction: Clinical manifestations and diagnosis. [employer]. https://www.[employer].com/contents/heart-failure-with-preserved-ejection-fraction-clinical-manifestations-and-diagnosis.  ICD-10-CM/PCS Coding Clinic [facility]  Quarter ICD-10, Effective with discharges: September 8, 2020 [facility] § Heart failure with mid-range or mildly reduced ejection fraction (2020).  ICD-10-CM/PCS Coding Clinic Third Quarter ICD-10, Effective with discharges: September 8, 2020 [facility] § Heart failure with recovered ejection fraction (2020).  Form No. [id number]